# Patient Record
Sex: FEMALE | Race: WHITE | HISPANIC OR LATINO | Employment: UNEMPLOYED | URBAN - METROPOLITAN AREA
[De-identification: names, ages, dates, MRNs, and addresses within clinical notes are randomized per-mention and may not be internally consistent; named-entity substitution may affect disease eponyms.]

---

## 2018-06-11 PROBLEM — E66.812 CLASS 2 OBESITY DUE TO EXCESS CALORIES WITH BODY MASS INDEX (BMI) OF 36.0 TO 36.9 IN ADULT: Status: ACTIVE | Noted: 2018-06-11

## 2018-06-11 PROBLEM — E66.09 CLASS 2 OBESITY DUE TO EXCESS CALORIES WITH BODY MASS INDEX (BMI) OF 36.0 TO 36.9 IN ADULT: Status: ACTIVE | Noted: 2018-06-11

## 2019-06-11 PROBLEM — G51.0 FACIAL PARALYSIS/BELLS PALSY: Status: ACTIVE | Noted: 2019-06-11

## 2020-03-06 PROBLEM — M17.12 PATELLOFEMORAL ARTHRITIS OF LEFT KNEE: Status: ACTIVE | Noted: 2020-03-06

## 2021-09-13 PROBLEM — R07.9 CHEST PAIN: Status: ACTIVE | Noted: 2021-09-13

## 2021-09-14 PROBLEM — E66.812 CLASS 2 OBESITY IN ADULT: Chronic | Status: ACTIVE | Noted: 2021-09-14

## 2022-09-15 PROBLEM — Z74.09 IMPAIRED FUNCTIONAL MOBILITY AND ACTIVITY TOLERANCE: Status: ACTIVE | Noted: 2022-09-15

## 2023-10-23 ENCOUNTER — TELEPHONE (OUTPATIENT)
Dept: FAMILY MEDICINE | Facility: CLINIC | Age: 48
End: 2023-10-23

## 2023-10-23 NOTE — TELEPHONE ENCOUNTER
The following medication needs a prior authorization:     Medication Name: blood-glucose sensor (DEXCOM G7 SENSOR) [place]    Dosage: 1 device     Frequency: every 10 days    Directions for use: 1 Device by Misc.(Non-Drug; Combo Route) route every 10 days    Diagnosis: Type 2 diabetes mellitus without complication, with long-term current use of insulin      Is the request for a reauthorization? No     Is the patient currently stable on therapy? No     Please list all therapeutic alternatives previously used with start/end dates and outcome:    Blood glucose meter kit (Contour next) , lancets, diagnostic strips testing three times daily

## 2023-10-24 ENCOUNTER — TELEPHONE (OUTPATIENT)
Dept: FAMILY MEDICINE | Facility: CLINIC | Age: 48
End: 2023-10-24

## 2024-01-07 NOTE — PROGRESS NOTES
Subjective:       Patient ID: Nicole Coker is a 48 y.o. female.    Chief Complaint: Diabetes, Hyperlipidemia, Manic Behavior, Polypharmacy, Constipation, Social Isolation, and Heavy menstruation and painful menstruation    Chief Complaint   Patient presents with    Diabetes    Hyperlipidemia    Manic Behavior    Polypharmacy    Constipation    Social Isolation    Heavy menstruation and painful menstruation       Patient is a 48-year-old female who comes for follow-up.  She is by herself here today.  Previously she was either accompanied with the  or with her mother.  .    Her chronic medical issues include the following    1.-history of Bell's palsy with significant residual weakness on the right side of the face.  2.-bipolar disorder  3.-type 2 diabetes mellitus  4.-dyslipidemia   5.-chronic constipation on Linzess 72 mcg  6.-polypharmacy and multiple CNS altering medications  7.-chronic disability  8.-social isolation   9.-cognitive impairment    Previous visit we had reviewed about 28 different type of medications, appointments, inhalers, lotions and portions and neither of us was clear as to who is prescribing and for what reasons.  Patient has limited cognition and insight about her medical issues was noted as well as her limited social support structure.  I would advised her to checkup her medication list for every possible medication and as to the reason and the prescriber.  I was also mostly concerned about at least 9 CNS altering medications that she was on which patient was herself unaware about but was listed in the epic system.    She also complains of heavy menstrual periods and painful menstrual periods.  She is on Ortho-Cyclen but does not seem to be helping.  She has not had a gynecological checkup for several years.        Interim updates include the following:-    12/13/2023-saw Ms. Mercy Olmos nurse practitioner-cardiology for intermittent symptoms of shortness on breath  and palpitations.    Her cardiac monitor was reviewed her symptoms did not correlate with any abnormal rhythm on the cardiac monitor.  Maximum heart rate recorded was 143 beats per minute and average heart rate of 92.  Isolated supraventricular ectopics and isolated ventricular ectopics echo results were considered to be normal.  Stress test was negative for reversible ischemia.  Chest pain was considered to be atypical and warning signs and symptoms of MI were discussed.  She also had thoracic back pain and she was recommended stretching exercises.    11/20/2023-saw Ms.Karie Clayton points time, nurse practitioner pulmonary disease     Follow-up for asthma and lot of wheezing noted to be using Brovana and budesonide and DuoNeb 3 times a day.  She was advised to continue the inhalers as before and Singulair 10 mg with a 10 month follow-up.    11/07/2023-saw Dr. Ochoa from [facility].  Symptoms of palpitations, shortness for breath and mixed dyslipidemia were noted and nuclear stress test, cardiac monitor and EKG were ordered.  ////////    On previous visit I would discussed her family members to bring a complete list of medications and semblance of as to what is going on.    Today also I noticed about 28 entries in her prescription lists including inhalers, creams, lotions, male pills, capsules, injections and other devices.  As to who is giving her those medications and for what purpose, patient remains unclear.  Her 9 CNS altering medications include the following:-    1.-trazodone  2.-tizanidine  3.-sertraline  4.-promethazine  5.-primidone  6.-hydroxyzine  7.-gabapentin  8.-Depakote  9.-Abilify    Other potential medication which could affect the CNS include the following:-    1.-Zyrtec with sedation properties  2.-Zofran      Diabetes control:-as to monitoring of blood sugars at home, patient continues to be clueless.  Today she requests a prescription for continues glucose monitoring like Dexcom or Jessica.   She is tired of monitoring her blood sugars with fingerstick and on asking her as to what the numbers are, she is clueless.    Discussed about preventive care issues for the following:-    1.-pneumonia vaccine:-agrees to take pneumonia vaccine today  2.-colorectal screening  3.-Pap smear  4.-ophthalmology examination   5.-flu vaccine  6.-COVID precautions and vaccination.      She states that she has changed her insurance.  She would like to get a neurology consultation again.        Diabetes  She presents for her follow-up diabetic visit. She has type 2 diabetes mellitus. No MedicAlert identification noted. The initial diagnosis of diabetes was made 5 years ago. Hypoglycemia symptoms include dizziness, mood changes, nervousness/anxiousness, sleepiness, sweats and tremors. Pertinent negatives for hypoglycemia include no confusion, headaches, hunger, pallor, seizures or speech difficulty. Associated symptoms include blurred vision, fatigue, foot paresthesias, polydipsia and polyuria. Pertinent negatives for diabetes include no chest pain, no foot ulcerations, no polyphagia, no visual change, no weakness and no weight loss. Pertinent negatives for hypoglycemia complications include no blackouts, no hospitalization, no nocturnal hypoglycemia, no required assistance and no required glucagon injection. Symptoms are stable. Pertinent negatives for diabetic complications include no autonomic neuropathy, CVA, heart disease, impotence, nephropathy, peripheral neuropathy, PVD or retinopathy. There are no known risk factors for coronary artery disease. Current diabetic treatment includes insulin injections and oral agent (monotherapy). She is compliant with treatment all of the time. She is currently taking insulin pre-breakfast and at bedtime. Insulin injections are given by significant other. Rotation sites for injection include the abdominal wall. Her weight is fluctuating dramatically. She is following a diabetic diet.  Meal planning includes carbohydrate counting. She has not had a previous visit with a dietitian. She never participates in exercise. She monitors blood glucose at home 1-2 x per day. She monitors urine at home <1 x per month. Blood glucose monitoring compliance is inadequate. Her home blood glucose trend is fluctuating minimally. She sees a podiatrist.Eye exam is current.       Past Medical History:   Diagnosis Date    Allergy     Anxiety     Asthma     Back pain     Behavioral problem     Bipolar disorder     Depression     Diabetes mellitus type I 2018    Digestive disorder     Facial paralysis/Viola palsy 2019    Headache(784.0)     Hx of psychiatric care     Hypercholesteremia     Left knee pain 2018    pain worse- some swelling- gave out & recently fell- surg scheduled    Migraines     occ    Psychiatric problem     Psychosis     Therapy      Social History     Socioeconomic History    Marital status:    Occupational History    Occupation: Unemployed     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   Tobacco Use    Smoking status: Former     Current packs/day: 0.00     Average packs/day: 0.3 packs/day for 1 year (0.3 ttl pk-yrs)     Types: Cigars, Cigarettes     Start date: 2020     Quit date: 2021     Years since quittin.7    Smokeless tobacco: Never   Substance and Sexual Activity    Alcohol use: Not Currently    Drug use: No    Sexual activity: Yes     Partners: Male     Social Determinants of Health     Financial Resource Strain: Low Risk  (2023)    Overall Financial Resource Strain (CARDIA)     Difficulty of Paying Living Expenses: Not hard at all   Food Insecurity: No Food Insecurity (2023)    Hunger Vital Sign     Worried About Running Out of Food in the Last Year: Never true     Ran Out of Food in the Last Year: Never true   Transportation Needs: No Transportation Needs (2023)    PRAPARE - Transportation     Lack of Transportation  (Medical): No     Lack of Transportation (Non-Medical): No   Physical Activity: Inactive (12/13/2023)    Exercise Vital Sign     Days of Exercise per Week: 4 days     Minutes of Exercise per Session: 0 min   Stress: Stress Concern Present (12/13/2023)    [place] [employer] - Occupational Stress Questionnaire     Feeling of Stress : To some extent   Social Connections: Moderately Isolated (12/13/2023)    Social Connection and Isolation Panel [NHANES]     Frequency of Communication with Friends and Family: Three times a week     Frequency of Social Gatherings with Friends and Family: Never     Attends Hoahaoism Services: Never     Active Member of Clubs or Organizations: No     Attends Club or Organization Meetings: Never     Marital Status:    Housing Stability: High Risk (12/13/2023)    Housing Stability Vital Sign     Unable to Pay for Housing in the Last Year: No     Number of Places Lived in the Last Year: 9     Unstable Housing in the Last Year: No     Past Surgical History:   Procedure Laterality Date    ARTHROSCOPY OF KNEE Left 7/27/2020    Procedure: ARTHROSCOPY, KNEE;  Surgeon: Hardy Delgado MD;  Location: [facility];  Service: Orthopedics;  Laterality: Left;    CARPAL TUNNEL RELEASE Right 1/11/2023    Procedure: RELEASE, CARPAL TUNNEL;  Surgeon: Hardy Delgado MD;  Location: [facility] OR;  Service: Orthopedics;  Laterality: Right;    CHONDROPLASTY OF KNEE  11/13/2019    Procedure: CHONDROPLASTY, KNEE;  Surgeon: Hardy Delgado MD;  Location: [facility] OR;  Service: Orthopedics;;    HAND SURGERY      KNEE ARTHROPLASTY Left 7/27/2020    Procedure: ARTHROPLASTY, KNEE patellofemoral replacement;  Surgeon: Hardy Delgado MD;  Location: [facility];  Service: Orthopedics;  Laterality: Left;  ARTHOSURFACE-NEGRO.  PROCEDURE IS PATELLA FEMORAL REPLACEMENT, NOT TKA    KNEE ARTHROSCOPY W/ MENISCECTOMY Left 11/13/2019    Procedure: ARTHROSCOPY, KNEE, WITH MENISCECTOMY;  Surgeon: Hardy Delgado MD;  Location:  [facility] OR;  Service: Orthopedics;  Laterality: Left;    KNEE ARTHROSCOPY W/ MENISCECTOMY Left 12/1/2021    Procedure: ARTHROSCOPY, KNEE, WITH MENISCECTOMY;  Surgeon: Hardy Delgado MD;  Location: [facility] OR;  Service: Orthopedics;  Laterality: Left;    SHOULDER SURGERY       Family History   Problem Relation Age of Onset    Alcohol abuse Mother     Cirrhosis Mother     Early death Mother     Alcohol abuse Father     Alcohol abuse Sister     Anxiety disorder Sister     Alcohol abuse Brother     Alcohol abuse Maternal Uncle     Drug abuse Maternal Uncle     Suicide Maternal Uncle     Diabetes Maternal Grandmother     Breast cancer Paternal Aunt     Breast cancer Maternal Aunt        Review of Systems   Constitutional:  Positive for fatigue, malaise/fatigue and unexpected weight change (Lost 4 lb of weight). Negative for activity change, appetite change and weight loss.   Eyes:  Positive for blurred vision. Negative for discharge, redness and visual disturbance.        Denies any double vision, diplopia or blurred vision.   Cardiovascular:  Negative for chest pain, palpitations and leg swelling.   Gastrointestinal:  Positive for constipation (Chronic constipation several medications may be causing that.  Currently on Linzess 72 mcg). Negative for abdominal pain, blood in stool, diarrhea, nausea and vomiting.   Endocrine: Positive for polydipsia and polyuria. Negative for polyphagia.        On very high dose of Trulicity.  Uncertain if this is causing her symptoms of nausea vomiting.  Off metformin now..   Genitourinary:  Negative for difficulty urinating, dysuria, hematuria, impotence and menstrual problem.        Menorrhagia and metrorrhagia   Musculoskeletal:  Positive for arthralgias. Negative for joint swelling and neck pain.   Skin:  Negative for pallor, rash and wound.   Neurological:  Positive for dizziness, tremors and syncope. Negative for seizures, speech difficulty, weakness and headaches.        Denies any new  "onset of slurred speech.  Old right-sided Bell's palsy has been noted   Psychiatric/Behavioral:  Positive for behavioral problems and dysphoric mood. Negative for agitation and confusion. The patient is nervous/anxious.         Longstanding mental and behavioral issues on several psychotropic medications including Abilify, Depakote, hydroxyzine         Objective:      Blood pressure 104/80, pulse 93, height 5' 2" (1.575 m), weight 81.6 kg (180 lb), last menstrual period 12/30/2023. Body mass index is 32.92 kg/m².  Physical Exam  Vitals and nursing note reviewed.   Constitutional:       General: She is not in acute distress.     Appearance: She is well-developed. She is ill-appearing. She is not diaphoretic.      Comments: BMI 32.92   HENT:      Head: Normocephalic and atraumatic.      Mouth/Throat:      Mouth: Mucous membranes are moist.   Eyes:      Extraocular Movements:      Right eye: Nystagmus present.      Left eye: Nystagmus present.      Conjunctiva/sclera: Conjunctivae normal.      Pupils:      Right eye: Pupil is round and reactive.      Left eye: Pupil is round and reactive.      Comments: Horizontal nystagmus bilaterally   Neck:      Trachea: Trachea normal.   Cardiovascular:      Rate and Rhythm: Normal rate and regular rhythm.      Pulses:           Dorsalis pedis pulses are 1+ on the right side and 1+ on the left side.      Heart sounds: Normal heart sounds, S1 normal and S2 normal.   Pulmonary:      Effort: Pulmonary effort is normal. No respiratory distress.      Breath sounds: Normal breath sounds.   Abdominal:      Palpations: Abdomen is soft. Abdomen is not rigid.      Tenderness: There is no abdominal tenderness. There is no guarding.   Musculoskeletal:      Cervical back: Neck supple. No muscular tenderness.      Right foot: Normal range of motion.      Left foot: Normal range of motion.   Feet:      Right foot:      Protective Sensation: 5 sites tested.  5 sites sensed.      Skin integrity: No " ulcer or blister.      Toenail Condition: Right toenails are normal.      Left foot:      Protective Sensation: 5 sites tested.  5 sites sensed.      Skin integrity: No ulcer or blister.      Toenail Condition: Left toenails are normal.      Comments: Ant bite marks were noted.  Patient has been advised caution and not to walk bare feet on ground outside.  Lymphadenopathy:      Cervical: No cervical adenopathy.   Skin:     General: Skin is warm and dry.      Coloration: Skin is not jaundiced or pale.      Findings: No lesion or rash.   Neurological:      Mental Status: She is alert. Mental status is at baseline.      Cranial Nerves: Facial asymmetry (chronic) present.      Comments: Left-sided Bell's palsy.  (previously erroneously written as right-sided. ).   Psychiatric:         Mood and Affect: Affect is flat and inappropriate.         Speech: Speech is delayed.         Behavior: Behavior is slowed. Behavior is cooperative.         Cognition and Memory: Cognition is impaired.      Comments: Incongruent and rather flat affect.  Limited insight.           Assessment/Plan:       1. Type 2 diabetes mellitus without complication, with long-term current use of insulin  Comments:  Labs for blood sugar are still pending.  Wanted a CGM device.  Even regular monitoring his difficult and I am not sure if CGM will be really helpful for her.  Orders:  -     blood-glucose sensor (DEXCOM G7 SENSOR) [place]; 1 Device by Misc.(Non-Drug; Combo Route) route every 10 days.  Dispense: 3 each; Refill: 5  -     Hemoglobin A1C; Future; Expected date: 01/12/2024  -     Comprehensive Metabolic Panel; Future; Expected date: 01/12/2024  -     Microalbumin/creatinine urine ratio; Future; Expected date: 01/12/2024  -     Lipid Panel; Future; Expected date: 01/12/2024    2. Dizziness  -     CBC Auto Differential; Future; Expected date: 01/12/2024    3. Other migraine with status migrainosus, not intractable    4. Episodic tension-type headache,  not intractable    5. Chronic idiopathic constipation    6. Hormone replacement therapy    7. At increased risk for social isolation    8. Cognitive deficits    9. Menorrhagia with irregular cycle  -     Ambulatory referral/consult to Obstetrics / Gynecology; Future; Expected date: 01/18/2024  -     CBC Auto Differential; Future; Expected date: 01/12/2024    10. Metrorrhagia  -     Ambulatory referral/consult to Obstetrics / Gynecology; Future; Expected date: 01/18/2024  -     CBC Auto Differential; Future; Expected date: 01/12/2024    11. Vitamin D deficiency  -     Vitamin D; Future; Expected date: 01/12/2024    12. Screen for colon cancer  -     Cologuard Screening (Multitarget Stool DNA); Future; Expected date: 01/11/2024           Management for diabetes continues to be challenging.  Probably devices like CGM and sensors are fancy device with no meaningful benefit on her diabetes.  Labs have not been done at this point.      Hospital Outpatient Visit on 12/11/2023   Component Date Value Ref Range Status    85% Max Predicted HR 12/11/2023 146   Final    Max Predicted HR 12/11/2023 172   Final    [facility] CV CPX PATIENT IS MALE 12/11/2023 0.0   Final    [facility] CV CPX PATIENT IS FEMALE 12/11/2023 1.0   Final    Peak HR 12/11/2023 95.0  bpm Final    % Max HR Achieved 12/11/2023 58   Final    EF + QEF 12/11/2023 53  % Final    Ejection Fraction- High Stress 12/11/2023 65  % Final    End diastolic index (mL/m2) 12/11/2023 93  mL/m2 Final    End diastolic index (mL/m2) 12/11/2023 153  mL/m2 Final    End systolic index (mL/m2) 12/11/2023 31  mL/m2 Final    End systolic index (mL/m2) 12/11/2023 71  mL/m2 Final    Nuc Rest EF 12/11/2023 78   Final    Nuc Rest Diastolic Volume Index 12/11/2023 49   Final    Nuc Rest Systolic Volume Index 12/11/2023 11   Final    Nuc Stress EF 12/11/2023 66  % Final    Nuc Rest Diastolic Volume Index 12/11/2023 56   Final    Nuc Rest Systolic Volume Index 12/11/2023 19   Final    dose 12/11/2023  "0.4  mg Final    HR at rest 12/11/2023 79  bpm Final    Systolic blood pressure 12/11/2023 110  mmHg Final    Diastolic blood pressure 12/11/2023 84  mmHg Final    RPP 12/11/2023 8,690   Final    Peak Systolic BP 12/11/2023 110  mmHg Final    Peak Diatolic BP 12/11/2023 72  mmHg Final    Peak RPP 12/11/2023 10,450   Final    1 Minute Recovery HR 12/11/2023 95  bpm Final   Hospital Outpatient Visit on 11/22/2023   Component Date Value Ref Range Status    BSA 11/22/2023 1.86  m2 Final    LVOT stroke volume 11/22/2023 66.52  cm3 Final    LVIDd 11/22/2023 3.81  3.5 - 6.0 cm Final    LV Systolic Volume 11/22/2023 19.70  mL Final    LV Systolic Volume Index 11/22/2023 10.9  mL/m2 Final    LVIDs 11/22/2023 2.38  2.1 - 4.0 cm Final    LV Diastolic Volume 11/22/2023 62.31  mL Final    LV Diastolic Volume Index 11/22/2023 34.62  mL/m2 Final    IVS 11/22/2023 1.06  0.6 - 1.1 cm Final    LVOT diameter 11/22/2023 2.09  cm Final    LVOT area 11/22/2023 3.4  cm2 Final    FS 11/22/2023 38  28 - 44 % Final    Left Ventricle Relative Wall Thick* 11/22/2023 0.55  cm Final    Posterior Wall 11/22/2023 1.05  0.6 - 1.1 cm Final    LV mass 11/22/2023 127.20  g Final    LV Mass Index 11/22/2023 71  g/m2 Final    MV Peak E Noe 11/22/2023 0.58  m/s Final    TDI LATERAL 11/22/2023 0.11  m/s Final    TDI SEPTAL 11/22/2023 0.06  m/s Final    E/E' ratio 11/22/2023 6.82  m/s Final    MV Peak A Noe 11/22/2023 0.63  m/s Final    TR Max Noe 11/22/2023 1.49  m/s Final    E/A ratio 11/22/2023 0.92   Final    IVRT 11/22/2023 74.22  msec Final    E wave deceleration time 11/22/2023 228.84  msec Final    MV "A" wave duration 11/22/2023 [id number]  msec Final    LV SEPTAL E/E' RATIO 11/22/2023 9.67  m/s Final    LV LATERAL E/E' RATIO 11/22/2023 5.27  m/s Final    LVOT peak noe 11/22/2023 1.08  m/s Final    Left Ventricular Outflow Tract Qian* 11/22/2023 0.74  cm/s Final    Left Ventricular Outflow Tract Qian* 11/22/2023 2.50  mmHg Final    RVDD " 11/22/2023 2.23  cm Final    RV S' 11/22/2023 8.63  cm/s Final    TAPSE 11/22/2023 1.41  cm Final    LA size 11/22/2023 3.08  cm Final    Left Atrium Minor Axis 11/22/2023 4.16  cm Final    Left Atrium Major Axis 11/22/2023 4.34  cm Final    RA Major [place] 11/22/2023 4.13  cm Final    AV mean gradient 11/22/2023 4  mmHg Final    AV peak gradient 11/22/2023 6  mmHg Final    Ao peak paulo 11/22/2023 1.20  m/s Final    Ao VTI 11/22/2023 20.90  cm Final    LVOT peak VTI 11/22/2023 19.40  cm Final    AV valve area 11/22/2023 3.18  cm² Final    AV Velocity Ratio 11/22/2023 0.90   Final    AV index (prosthetic) 11/22/2023 0.93   Final    DORENE by Velocity Ratio 11/22/2023 3.09  cm² Final    MV mean gradient 11/22/2023 1  mmHg Final    MV peak gradient 11/22/2023 3  mmHg Final    MV stenosis pressure 1/2 time 11/22/2023 56.28  ms Final    MV valve area p 1/2 method 11/22/2023 3.91  cm2 Final    MV valve area by continuity eq 11/22/2023 3.39  cm2 Final    MV VTI 11/22/2023 19.6  cm Final    Triscuspid Valve Regurgitation Pea* 11/22/2023 9  mmHg Final    PV PEAK VELOCITY 11/22/2023 1.01  m/s Final    PV peak gradient 11/22/2023 4  mmHg Final    Pulmonary Valve Mean Velocity 11/22/2023 0.74  m/s Final    Ao root annulus 11/22/2023 2.67  cm Final    IVC diameter 11/22/2023 1.30  cm Final    Mean e' 11/22/2023 0.09  m/s Final    ZLVIDS 11/22/2023 -2.03   Final    ZLVIDD 11/22/2023 -2.67   Final    AORTIC VALVE CUSP SEPERATION 11/22/2023 1.77  cm Final    TV resting pulmonary artery pressu* 11/22/2023 12  mmHg Final    RV TB RVSP 11/22/2023 4  mmHg Final    Est. RA pres 11/22/2023 3  mmHg Final   Hospital Outpatient Visit on 11/09/2023   Component Date Value Ref Range Status    Holter Hookup Date 11/09/2023 [id number]   Final    Holter Hookup Time 11/09/2023 [id number]   Final    Holter Study End Date 11/09/2023 [id number]   Final    Holter Study End Time 11/09/2023 [id number]   Final    Holter Scan Date 11/09/2023 [id number]   Final    Sinus min  HR 11/09/2023 69  bpm Final    Sinus max hr 11/09/2023 143  bpm Final    Sinus avg hr 11/09/2023 92  bpm Final    Event Monitor Day 11/09/2023 5   Final    Holter length hours 11/09/2023 0   Final    holter length minutes 11/09/2023 0   Final    holter length dec hours 11/09/2023 120.00   Final     As far as diabetes control is concerned, we are not certain about it.  She was not done her labs which were ordered previously.  Probably limited insight about her medical conditions and rigor of compliance.  Seeks fancy machines and devices but does not work on them to produce optimal results.  (Dexcom).  I have given another prescription for Dexcom.      Labs have been ordered.    Social issues and stewardship of medical care challenges still continue.    Her chest pain was determined to be probably noncardiac after normal stress test and cardiac monitor.        Following preventive care measures have been discussed:-  1.-influenza vaccine-completed on 10/01/2023  2.-pneumonia vaccine-completed on 10/17/2023  3.-COVID precautions and vaccination-had 3 vaccinations thus far.  4.-RSV vaccine which is available at this point-consider getting the vaccine from pharmacy  5..-colonoscopy or colorectal had a brief discussion about.  Agrees for Cologuard testing.  Hopefully her mother helps her or family members help her accomplish this test.    She does have some menorrhagia and menometrorrhagia.  Her last gynecological checkup was perhaps several years back.  She does not remember or know our previous gynecologist was.  She may not have enough knowledge/motivation or hustle to accomplish a gynecological follow-up.  Referral has been given.  Advised family members to help her.    Labs have been read written and ordered again including vitamin-D level.    Follow-up to be arranged in about 3-4 months time.      Multiple psychotropic medications have been noted and I still do not have a clue who her psychiatrist is.  Patient  "herself does not remember.  (outside epic system).        Spent andrae 30 minutes with patient which involved review of pts medical conditions, labs, medications and with 50% of time face-to-face discussion about medical problems, management and any applicable changes.  Follow up in about 4 months (around 5/11/2024), or if symptoms worsen or fail to improve, for Diabetes/HTN/Lipids.      Current Outpatient Medications:     albuterol-ipratropium (DUO-NEB) 2.5 mg-0.5 mg/3 mL nebulizer solution, Take 3 mLs by nebulization every 6 (six) hours as needed for Wheezing. Rescue, Disp: 120 each, Rfl: 4    arformoteroL (BROVANA) 15 mcg/2 mL Nebu, Take 2 mLs (15 mcg total) by nebulization every 12 (twelve) hours. Controller, Disp: 120 mL, Rfl: 6    ARIPiprazole (ABILIFY) 5 MG Tab, Take 5 mg by mouth once daily., Disp: , Rfl:     atorvastatin (LIPITOR) 40 MG tablet, TAKE 1 TABLET BY MOUTH ONCE DAILY IN THE EVENING, Disp: 90 tablet, Rfl: 1    azelastine (ASTELIN) 137 mcg (0.1 %) nasal spray, 1 spray (137 mcg total) by Nasal route 2 (two) times daily., Disp: 30 mL, Rfl: 5    BD ULTRA-FINE SHORT PEN NEEDLE 31 gauge x 5/16" Ndle, USE AS DIRECTED ONCE IN THE EVENING, Disp: 100 each, Rfl: 5    blood sugar diagnostic Strp, 1 strip by Misc.(Non-Drug; Combo Route) route 3 (three) times daily. Contour next EZ test strips, Disp: 50 strip, Rfl: 6    cetirizine (ZYRTEC) 10 MG tablet, Take 1 tablet by mouth once daily, Disp: 30 tablet, Rfl: 2    CHILDREN'S ASPIRIN 81 mg Chew, Take 81 mg by mouth., Disp: , Rfl:     diclofenac (VOLTAREN) 75 MG EC tablet, Take 1 tablet (75 mg total) by mouth 2 (two) times daily as needed (back pain)., Disp: 30 tablet, Rfl: 2    divalproex ER (DEPAKOTE) 500 MG Tb24, Take 1,000 mg by mouth once daily. , Disp: , Rfl: 2    dulaglutide (TRULICITY) 4.5 mg/0.5 mL pen injector, Inject 4.5 mg into the skin every 7 days., Disp: 4 pen, Rfl: 11    ergocalciferol (ERGOCALCIFEROL) 50,000 unit Cap, Take 1 capsule (50,000 Units " total) by mouth every 7 days., Disp: 4 capsule, Rfl: 5    fluticasone propionate (FLONASE) 50 mcg/actuation nasal spray, Use 1 spray(s) in each nostril twice daily, Disp: 16 g, Rfl: 5    gabapentin (NEURONTIN) 300 MG capsule, Take 1 capsule (300 mg total) by mouth every evening., Disp: 30 capsule, Rfl: 2    hydrOXYzine pamoate (VISTARIL) 25 MG Cap, Take 1 capsule by mouth nightly as needed., Disp: , Rfl: 1    insulin (LANTUS SOLOSTAR U-100 INSULIN) glargine 100 units/mL SubQ pen, INJECT 65 UNITS SUBCUTANEOUSLY IN THE MORNING  AND 65 UNITS SUBCUTANEOUSLY IN THE EVENING ,MAY  TITRATE  UP  2  UNITS  PER  WEEK  TO  REACH  FASTING  GLUCOSE  OF  110. Max dose 85 units daily, Disp: 30 mL, Rfl: 2    lancets 32 gauge Misc, 1 lancet by Misc.(Non-Drug; Combo Route) route 3 (three) times daily as needed., Disp: 100 each, Rfl: 5    linaCLOtide (LINZESS) 72 mcg Cap capsule, Take 1 capsule (72 mcg total) by mouth before breakfast., Disp: 30 capsule, Rfl: 5    montelukast (SINGULAIR) 10 mg tablet, Take 1 tablet (10 mg total) by mouth every evening., Disp: 30 tablet, Rfl: 6    nebulizer and compressor (COMP-AIR NEBULIZER COMPRESSOR) Tanvi, To be used to nebulize medication, Disp: 1 each, Rfl: 0    norgestimate-ethinyl estradioL (ORTHO-CYCLEN) 0.25-35 mg-mcg per tablet, Take 1 tablet by mouth once daily., Disp: 28 tablet, Rfl: 11    ondansetron (ZOFRAN-ODT) 4 MG TbDL, Take 1 tablet (4 mg total) by mouth every 6 (six) hours as needed (nausea)., Disp: 30 tablet, Rfl: 1    primidone (MYSOLINE) 50 MG Tab, Take 100 mg by mouth 3 (three) times daily., Disp: , Rfl:     sertraline (ZOLOFT) 50 MG tablet, Take 50 mg by mouth., Disp: , Rfl:     sumatriptan (IMITREX) 100 MG tablet, Take 1 tablet (100 mg total) by mouth as needed (Take at onset of the headache and repeat 2 hours later if needed.  Max 2 tabs in 24 hours.)., Disp: 15 tablet, Rfl: 5    TENS unit and electrodes Cmpk, 1 each by Misc.(Non-Drug; Combo Route) route once daily., Disp: 1  each, Rfl: 0    tiZANidine (ZANAFLEX) 4 MG tablet, Take 1 tablet (4 mg total) by mouth every 8 (eight) hours., Disp: 90 tablet, Rfl: 5    traZODone (DESYREL) 100 MG tablet, Take 150 mg by mouth nightly as needed., Disp: , Rfl:     blood-glucose meter kit, Use as instructed, Disp: 1 each, Rfl: 0    blood-glucose sensor (DEXCOM G7 SENSOR) Tanvi, 1 Device by Misc.(Non-Drug; Combo Route) route every 10 days., Disp: 3 each, Rfl: 5

## 2024-01-11 ENCOUNTER — OFFICE VISIT (OUTPATIENT)
Dept: FAMILY MEDICINE | Facility: CLINIC | Age: 49
End: 2024-01-11
Payer: MEDICAID

## 2024-01-11 VITALS
BODY MASS INDEX: 33.13 KG/M2 | SYSTOLIC BLOOD PRESSURE: 104 MMHG | HEART RATE: 93 BPM | DIASTOLIC BLOOD PRESSURE: 80 MMHG | HEIGHT: 62 IN | WEIGHT: 180 LBS

## 2024-01-11 DIAGNOSIS — G43.801 OTHER MIGRAINE WITH STATUS MIGRAINOSUS, NOT INTRACTABLE: ICD-10-CM

## 2024-01-11 DIAGNOSIS — N92.1 MENORRHAGIA WITH IRREGULAR CYCLE: ICD-10-CM

## 2024-01-11 DIAGNOSIS — Z79.890 HORMONE REPLACEMENT THERAPY: ICD-10-CM

## 2024-01-11 DIAGNOSIS — Z91.89 AT INCREASED RISK FOR SOCIAL ISOLATION: ICD-10-CM

## 2024-01-11 DIAGNOSIS — Z12.11 SCREEN FOR COLON CANCER: ICD-10-CM

## 2024-01-11 DIAGNOSIS — G44.219 EPISODIC TENSION-TYPE HEADACHE, NOT INTRACTABLE: ICD-10-CM

## 2024-01-11 DIAGNOSIS — N92.1 METRORRHAGIA: ICD-10-CM

## 2024-01-11 DIAGNOSIS — K59.04 CHRONIC IDIOPATHIC CONSTIPATION: ICD-10-CM

## 2024-01-11 DIAGNOSIS — E55.9 VITAMIN D DEFICIENCY: ICD-10-CM

## 2024-01-11 DIAGNOSIS — E11.9 TYPE 2 DIABETES MELLITUS WITHOUT COMPLICATION, WITH LONG-TERM CURRENT USE OF INSULIN: Primary | Chronic | ICD-10-CM

## 2024-01-11 DIAGNOSIS — Z79.4 TYPE 2 DIABETES MELLITUS WITHOUT COMPLICATION, WITH LONG-TERM CURRENT USE OF INSULIN: Primary | Chronic | ICD-10-CM

## 2024-01-11 DIAGNOSIS — R41.89 COGNITIVE DEFICITS: ICD-10-CM

## 2024-01-11 DIAGNOSIS — R42 DIZZINESS: ICD-10-CM

## 2024-01-11 PROCEDURE — 1159F MED LIST DOCD IN RCRD: CPT | Mod: CPTII,,, | Performed by: INTERNAL MEDICINE

## 2024-01-11 PROCEDURE — 3079F DIAST BP 80-89 MM HG: CPT | Mod: CPTII,,, | Performed by: INTERNAL MEDICINE

## 2024-01-11 PROCEDURE — 99214 OFFICE O/P EST MOD 30 MIN: CPT | Mod: PBBFAC,PN | Performed by: INTERNAL MEDICINE

## 2024-01-11 PROCEDURE — 1160F RVW MEDS BY RX/DR IN RCRD: CPT | Mod: CPTII,,, | Performed by: INTERNAL MEDICINE

## 2024-01-11 PROCEDURE — 3074F SYST BP LT 130 MM HG: CPT | Mod: CPTII,,, | Performed by: INTERNAL MEDICINE

## 2024-01-11 PROCEDURE — 99999 PR PBB SHADOW E&M-EST. PATIENT-LVL IV: CPT | Mod: PBBFAC,,, | Performed by: INTERNAL MEDICINE

## 2024-01-11 PROCEDURE — 3008F BODY MASS INDEX DOCD: CPT | Mod: CPTII,,, | Performed by: INTERNAL MEDICINE

## 2024-01-11 PROCEDURE — 99214 OFFICE O/P EST MOD 30 MIN: CPT | Mod: S$PBB,,, | Performed by: INTERNAL MEDICINE

## 2024-01-11 RX ORDER — BLOOD-GLUCOSE SENSOR
1 EACH MISCELLANEOUS
Qty: 3 EACH | Refills: 5 | Status: SHIPPED | OUTPATIENT
Start: 2024-01-11 | End: 2025-01-10

## 2024-01-12 ENCOUNTER — PATIENT OUTREACH (OUTPATIENT)
Dept: ADMINISTRATIVE | Facility: HOSPITAL | Age: 49
End: 2024-01-12
Payer: MEDICAID

## 2024-01-12 NOTE — PROGRESS NOTES
Population Health Chart Review & Patient Outreach Details    Outreach Performed: YES Portal    Additional [facility] Health Notes:           Updates Requested / Reviewed:      Updated Care Coordination Note, Care Everywhere, , Care Team Updated, and Immunizations Reconciliation Completed or Queried: [place]         Health Maintenance Topics Overdue:    Health Maintenance Due   Topic Date Due    Cervical Cancer Screening  Never done    Colorectal Cancer Screening  Never done    Eye Exam  07/23/2021    COVID-19 Vaccine (4 - 2023-24 season) 09/01/2023    Hemoglobin A1c  11/09/2023         Health Maintenance Topic(s) Outreach Outcomes & Actions Taken:    Cervical Cancer Screening - Outreach Outcomes & Actions Taken  : Pt Will Schedule with External Provider / Order Routed & Care Team Updated if Applicable    Colorectal Cancer Screening - Outreach Outcomes & Actions Taken  : Reminded Patient to Complete Already Received Home Test    Eye Exam - Outreach Outcomes & Actions Taken  : External Records Requested & Care Team Updated if Applicable

## 2024-01-12 NOTE — LETTER
AUTHORIZATION FOR RELEASE OF   CONFIDENTIAL INFORMATION    Dear Faith Franco,    We are seeing Nicole Coker, date of birth 1975, in the clinic at [facility]. Jaydon Gee MD is the patient's PCP. Nicole Coker has an outstanding lab/procedure at the time we reviewed her chart. In order to help keep her health information updated, she has authorized us to request the following medical record(s):     (  x)  EYE EXAM            Please fax records to [facility], Raina, Sanjay, MD, [phone]   If you have any questions, please contact       Romi Barajas  Nurse Clinical Care Coordinator  [facility]  Phone: [phone]  Fax: [phone]    Patient Name: Nicole Coker  : 1975  Patient Phone #: [phone]         CONSENT AND ACKNOWLEDGEMENT         FORM       Nicole Coker  MRN: [id number]  : 1975  Age: 48 y.o.  Sex: female       MEDICARE-PATIENTS CERTIFICATION, AUTHORIZATION TO RELEASE INFORMATION AND PAYMENT REQUEST:  I certify that the information given by me in applying under the Title XVII of Social Security Act is correct.  I authorize any vasquez of medical or other information about me to release to the Social Security Administration or its intermediaries or carriers any information needed for this or a related Medicare claim.  I request that payment of authorized benefits be made on my behalf to [facility] access hospital and [facility] ([facility]).  I also acknowledge upon admission, that I received the Important Message from Medicare.     AUTHORIZATION TO PAY INSURANCE BENEFITS:  For and in consideration of medical services rendered to the patient named herein, I hereby assign and transfer to [facility], including but not limited to hospital based physicians, attending physicians, consulting physicians, nurse  practitioners and physicians assistants the rights for the payment of medical benefits which I may have under the policy/policies identified by me during registration or any policy which may be determined hereafter to pay benefits otherwise payable to me or to a beneficiary designated in the policy.  By this assignment, I authorize payment directly to [facility], hospital based physicians, attending physicians and consulting physicians of all medical benefits payable under the aforesaid policy/policies, but not to exceed the hospitals and/or clinic regular charges.     GUARANTEE OF ACCOUNT:  I/We certify that the information given is true and correct to the best of my/our knowledge.  I/We understand that bills are payable within thirty (30) days of the date of service.  If it becomes necessary for the account to be referred to an  or collection agency, the undersigned agrees to pay the reasonable s fees or collection expenses. I/We safia permission and consent to [facility], our assignees, and third party collection agents to contact myself/us by any telephone number associated with myself/us, including wireless numbers and to leave answering machine and voicemail messages and include in any such messages, information required by law (including debt collection laws) and/or messages regarding amounts owed; to send text messages or emails using any email addresses I/we provided; to use pre-recorded/artificial voice messages  and/or an automatic dialing device in connection with any communications.   I/We agree to be responsible for the payment of all charges of this medical service and hospital based physicians, attending physicians and consulting physicians services rendered to the above named patient     COMMUNICATION AUTHORIZATION:   I hereby authorize Deneen Stephenson, to contact me on my cell phone and/or home phone using prerecorded messages, artificial voice messages, automatic  telephone dialing devices or other computer assisted technology, or by electronic mail, text messaging, or by any other form of electronic communication. This includes, but is not limited to, appointment reminders, yearly physical exam reminders, preventive care reminders, patient campaigns and welcome calls. I understand I have the right to opt out of these communications at any time.        Page 1 of 3                 CONSENT AND ACKNOWLEDGEMENT FORM CONTINUED     AUTHORIZATION TO RELEASE INFORMATION:  I hereby authorize [facility] and hospital based physicians to release the information for this occasion of service requested by my insurance company or third party payor for the purpose of obtaining payment for services rendered during this admission and/or to other healthcare providers for the purpose of follow-up care or evaluation of care.  This information may or may not include mental health and/or substance abuse information.     AUTHORIZATION FOR MEDICAL AND/OR SURGICAL TREATMENT:  I hereby authorize [facility] and its employees or agents to provide hospital care incident to this admission, including without limitations, consent to routine diagnostic procedures and medical treatment, which is to include whatever procedures that are deemed necessary by the admitting doctor and such other physicians or assistants as he may designate.     PERSONAL VALUABLES:      It is understood and agreed that the hospital maintains a safe for the safekeeping of money and valuables and the hospital shall not be liable for the loss of damage to any money, jewelry, glasses, documents, dentures, hearing aids or other articles of unusual value, unless placed therein, and shall not be liable for loss or damage to any other personal property, unless deposited with the hospital for safekeeping.  VALUABLES ARE NOT TO BE LEFT IN THE PATIENTS ROOM.     ADVANCE DIRECTIVES:  I understand that I am not required to have  Advance Directives in order to be treated.  I have received written information about my rights to formulate Advance Directives.     NOTICE OF PRIVACY PRACTICES/PATIENT RIGHTS/ADMISSION PACKET:  I acknowledge that I have received copies of the [facility] Notice of Privacy Practices, Patient Rights, and the Admission packet, which contains Smoking Cessation information. I understand that weapons, illegal drugs, or any other items considered  contraband, are not allowed on the [facility] campus, and that I do not have such items in my possession.        CONSENT TO PHOTOGRAPH AND/OR VIDEO TAPE DOCUMENTATION OF CARE:  I understand that photographs, videotapes, digital, or other images may be recorded to document my care.  I acknowledge that [facility] will retain the ownership rights to these photographs, videotapes, digital, or other images, and that I will be allowed access to view or obtain copies of any photographs, videotapes, digital, or other images created as part of the documentation of my care.  I understand that these images will be stored in a secure manner that will protect my privacy and that they will be kept for the time period required by law or by policy at [facility]. Images that identify me will be released and/or used outside the institution only upon written authorization from me or my legal representative ([place], 2001).                                                                                                                                Page 2 of 3                    CONSENT AND ACKNOWLEDGEMENT FORM CONTINUED     [place] IMMUNIZATION NETWORK (LINKS) PARTICIPATION:  I acknowledge that I have been informed about [place] Immunization Network, or LINKS.  I understand that it is a means to keep track of my immunization records for myself, doctors offices, hospitals and other health care providers through secure, electronic means.     INSURANCE NETWORK ACKNOWLEDGEMENT:                                                                             I acknowledge that I have received notice, based on the information available at this time, regarding the status of my insurance plan as in or out of network at [facility]. I understand that a full listing of accepted insurance plans can be found at the [facility] website.     NOTICE     HEALTH CARE SERVICES MAY BE PROVIDED TO YOU AT A NETWORK HEALTH CARE FACILITY BY FACILITY-BASED PHYSICIANS WHO ARE NOT IN YOUR HEALTH PLAN.  YOU MAY BE RESPONSIBLE FOR PAYMENT OF ALL OR PART OF THE FEES FOR THOSE OUT-OF-NETWORK SERVICES, IN ADDITION TO APPLICABLE AMOUNTS DUE FOR CO-PAYMENTS, COINSURANCE, DEDUCTIBLES, AND NON-COVERED SERVICES.  SPECIFIC INFORMATION ABOUT IN-NETWORK AND OUT-OF NETWORK FACILITY-BASED PHYSICIANS CAN BE FOUND AT THE WEBSITE ADDRESS OF YOUR HEALTH PLAN OR BY CALLING THE [email or web address] TELEPHONE NUMBER OF YOUR HEALTH PLAN.        I/WE HAVE READ, UNDERSTAND AND AGREE TO THE ABOVE.        _______________________________   Patient/Legal Guardian Signature     This signature was collected at 07/07/2023     Time (if no electronic signature):____________            _______________________________   Printed Name/Relationship to Patient       Witness  _______________________________   Witness Signature     This signature was collected at 07/07/2023        _______________________________   Printed Name         Page 3 of 3

## 2024-01-16 ENCOUNTER — TELEPHONE (OUTPATIENT)
Dept: OBSTETRICS AND GYNECOLOGY | Facility: CLINIC | Age: 49
End: 2024-01-16
Payer: MEDICAID

## 2024-01-16 NOTE — TELEPHONE ENCOUNTER
----- Message from Anne Marie Rodriguez sent at 1/16/2024 10:26 AM CST -----  Regarding: Call back  Type:  Sooner Apoointment Request    Caller is requesting a sooner appointment.  Caller declined first available appointment listed below.  Caller will not accept being placed on the waitlist and is requesting a message be sent to doctor.  Name of Caller:Pt    When is the first available appointment?n/a    Symptoms:Menorrhagia with irregular cycle    Would the patient rather a call back or a response via [facility]? Call back    Best Call Back Number:[phone]    Additional Information: Pt would like a sooner appt she cancelled appt for 1/17 due to bad weather. Thank you.

## 2024-01-29 ENCOUNTER — PATIENT MESSAGE (OUTPATIENT)
Dept: ADMINISTRATIVE | Facility: OTHER | Age: 49
End: 2024-01-29
Payer: MEDICAID

## 2024-01-30 LAB — NONINV COLON CA DNA+OCC BLD SCRN STL QL: NEGATIVE

## 2024-03-28 ENCOUNTER — PATIENT MESSAGE (OUTPATIENT)
Dept: ADMINISTRATIVE | Facility: HOSPITAL | Age: 49
End: 2024-03-28
Payer: MEDICAID

## 2024-04-15 ENCOUNTER — LAB VISIT (OUTPATIENT)
Dept: LAB | Facility: HOSPITAL | Age: 49
End: 2024-04-15
Attending: INTERNAL MEDICINE
Payer: COMMERCIAL

## 2024-04-15 ENCOUNTER — OFFICE VISIT (OUTPATIENT)
Dept: FAMILY MEDICINE | Facility: CLINIC | Age: 49
End: 2024-04-15
Payer: MEDICAID

## 2024-04-15 VITALS
SYSTOLIC BLOOD PRESSURE: 108 MMHG | BODY MASS INDEX: 33.13 KG/M2 | DIASTOLIC BLOOD PRESSURE: 51 MMHG | HEIGHT: 62 IN | WEIGHT: 180 LBS

## 2024-04-15 DIAGNOSIS — E11.65 TYPE 2 DIABETES MELLITUS WITH HYPERGLYCEMIA, WITH LONG-TERM CURRENT USE OF INSULIN: Primary | ICD-10-CM

## 2024-04-15 DIAGNOSIS — R42 DIZZINESS AND GIDDINESS: Primary | ICD-10-CM

## 2024-04-15 DIAGNOSIS — E55.9 AVITAMINOSIS D: ICD-10-CM

## 2024-04-15 DIAGNOSIS — Z79.4 ENCOUNTER FOR LONG-TERM (CURRENT) USE OF INSULIN: ICD-10-CM

## 2024-04-15 DIAGNOSIS — E11.9 TYPE 2 DIABETES MELLITUS WITHOUT COMPLICATION, WITH LONG-TERM CURRENT USE OF INSULIN: Chronic | ICD-10-CM

## 2024-04-15 DIAGNOSIS — Z79.4 TYPE 2 DIABETES MELLITUS WITH HYPERGLYCEMIA, WITH LONG-TERM CURRENT USE OF INSULIN: Primary | ICD-10-CM

## 2024-04-15 DIAGNOSIS — M79.641 RIGHT HAND PAIN: ICD-10-CM

## 2024-04-15 DIAGNOSIS — Z79.4 TYPE 2 DIABETES MELLITUS WITHOUT COMPLICATION, WITH LONG-TERM CURRENT USE OF INSULIN: Chronic | ICD-10-CM

## 2024-04-15 DIAGNOSIS — E11.9 DIABETES MELLITUS WITHOUT COMPLICATION: ICD-10-CM

## 2024-04-15 DIAGNOSIS — N92.1 METRORRHAGIA: ICD-10-CM

## 2024-04-15 LAB
25(OH)D3+25(OH)D2 SERPL-MCNC: 37 NG/ML (ref 30–96)
ALBUMIN SERPL BCP-MCNC: 3.9 G/DL (ref 3.5–5.2)
ALBUMIN/CREAT UR: 39.1 UG/MG (ref 0–30)
ALP SERPL-CCNC: 101 U/L (ref 55–135)
ALT SERPL W/O P-5'-P-CCNC: 59 U/L (ref 10–44)
ANION GAP SERPL CALC-SCNC: 9 MMOL/L (ref 8–16)
AST SERPL-CCNC: 66 U/L (ref 10–40)
BASOPHILS # BLD AUTO: 0.04 K/UL (ref 0–0.2)
BASOPHILS NFR BLD: 0.4 % (ref 0–1.9)
BILIRUB SERPL-MCNC: 0.5 MG/DL (ref 0.1–1)
BUN SERPL-MCNC: 10 MG/DL (ref 6–20)
CALCIUM SERPL-MCNC: 9.2 MG/DL (ref 8.7–10.5)
CHLORIDE SERPL-SCNC: 98 MMOL/L (ref 95–110)
CHOLEST SERPL-MCNC: 167 MG/DL (ref 120–199)
CHOLEST/HDLC SERPL: 4.3 {RATIO} (ref 2–5)
CO2 SERPL-SCNC: 25 MMOL/L (ref 23–29)
CREAT SERPL-MCNC: 0.6 MG/DL (ref 0.5–1.4)
CREAT UR-MCNC: 51.6 MG/DL (ref 15–325)
DIFFERENTIAL METHOD BLD: NORMAL
EOSINOPHIL # BLD AUTO: 0.1 K/UL (ref 0–0.5)
EOSINOPHIL NFR BLD: 1 % (ref 0–8)
ERYTHROCYTE [DISTWIDTH] IN BLOOD BY AUTOMATED COUNT: 13.5 % (ref 11.5–14.5)
EST. GFR  (NO RACE VARIABLE): >60 ML/MIN/1.73 M^2
ESTIMATED AVG GLUCOSE: 266 MG/DL (ref 68–131)
GLUCOSE SERPL-MCNC: 395 MG/DL (ref 70–110)
HBA1C MFR BLD: 10.9 % (ref 4.5–6.2)
HCT VFR BLD AUTO: 44.9 % (ref 37–48.5)
HDLC SERPL-MCNC: 39 MG/DL (ref 40–75)
HDLC SERPL: 23.4 % (ref 20–50)
HGB BLD-MCNC: 14.8 G/DL (ref 12–16)
IMM GRANULOCYTES # BLD AUTO: 0.03 K/UL (ref 0–0.04)
IMM GRANULOCYTES NFR BLD AUTO: 0.3 % (ref 0–0.5)
LDLC SERPL CALC-MCNC: 101.4 MG/DL (ref 63–159)
LYMPHOCYTES # BLD AUTO: 3.5 K/UL (ref 1–4.8)
LYMPHOCYTES NFR BLD: 37.4 % (ref 18–48)
MCH RBC QN AUTO: 28.5 PG (ref 27–31)
MCHC RBC AUTO-ENTMCNC: 33 G/DL (ref 32–36)
MCV RBC AUTO: 86 FL (ref 82–98)
MICROALBUMIN UR DL<=1MG/L-MCNC: 20.2 UG/ML
MONOCYTES # BLD AUTO: 0.6 K/UL (ref 0.3–1)
MONOCYTES NFR BLD: 5.8 % (ref 4–15)
NEUTROPHILS # BLD AUTO: 5.2 K/UL (ref 1.8–7.7)
NEUTROPHILS NFR BLD: 55.1 % (ref 38–73)
NONHDLC SERPL-MCNC: 128 MG/DL
NRBC BLD-RTO: 0 /100 WBC
PLATELET # BLD AUTO: 279 K/UL (ref 150–450)
PMV BLD AUTO: 9.8 FL (ref 9.2–12.9)
POTASSIUM SERPL-SCNC: 4.2 MMOL/L (ref 3.5–5.1)
PROT SERPL-MCNC: 6.9 G/DL (ref 6–8.4)
RBC # BLD AUTO: 5.2 M/UL (ref 4–5.4)
SODIUM SERPL-SCNC: 132 MMOL/L (ref 136–145)
TRIGL SERPL-MCNC: 133 MG/DL (ref 30–150)
WBC # BLD AUTO: 9.41 K/UL (ref 3.9–12.7)

## 2024-04-15 PROCEDURE — 1159F MED LIST DOCD IN RCRD: CPT | Mod: CPTII,,, | Performed by: INTERNAL MEDICINE

## 2024-04-15 PROCEDURE — 3074F SYST BP LT 130 MM HG: CPT | Mod: CPTII,,, | Performed by: INTERNAL MEDICINE

## 2024-04-15 PROCEDURE — 36415 COLL VENOUS BLD VENIPUNCTURE: CPT | Performed by: INTERNAL MEDICINE

## 2024-04-15 PROCEDURE — 3066F NEPHROPATHY DOC TX: CPT | Mod: CPTII,,, | Performed by: INTERNAL MEDICINE

## 2024-04-15 PROCEDURE — 85025 COMPLETE CBC W/AUTO DIFF WBC: CPT | Performed by: INTERNAL MEDICINE

## 2024-04-15 PROCEDURE — 1160F RVW MEDS BY RX/DR IN RCRD: CPT | Mod: CPTII,,, | Performed by: INTERNAL MEDICINE

## 2024-04-15 PROCEDURE — 3078F DIAST BP <80 MM HG: CPT | Mod: CPTII,,, | Performed by: INTERNAL MEDICINE

## 2024-04-15 PROCEDURE — 82306 VITAMIN D 25 HYDROXY: CPT | Performed by: INTERNAL MEDICINE

## 2024-04-15 PROCEDURE — 83036 HEMOGLOBIN GLYCOSYLATED A1C: CPT | Performed by: INTERNAL MEDICINE

## 2024-04-15 PROCEDURE — 80053 COMPREHEN METABOLIC PANEL: CPT | Performed by: INTERNAL MEDICINE

## 2024-04-15 PROCEDURE — 3046F HEMOGLOBIN A1C LEVEL >9.0%: CPT | Mod: CPTII,,, | Performed by: INTERNAL MEDICINE

## 2024-04-15 PROCEDURE — 82043 UR ALBUMIN QUANTITATIVE: CPT | Performed by: INTERNAL MEDICINE

## 2024-04-15 PROCEDURE — 3008F BODY MASS INDEX DOCD: CPT | Mod: CPTII,,, | Performed by: INTERNAL MEDICINE

## 2024-04-15 PROCEDURE — 99213 OFFICE O/P EST LOW 20 MIN: CPT | Mod: S$PBB,,, | Performed by: INTERNAL MEDICINE

## 2024-04-15 PROCEDURE — 99999 PR PBB SHADOW E&M-EST. PATIENT-LVL IV: CPT | Mod: PBBFAC,,, | Performed by: INTERNAL MEDICINE

## 2024-04-15 PROCEDURE — 80061 LIPID PANEL: CPT | Performed by: INTERNAL MEDICINE

## 2024-04-15 PROCEDURE — 3060F POS MICROALBUMINURIA REV: CPT | Mod: CPTII,,, | Performed by: INTERNAL MEDICINE

## 2024-04-15 PROCEDURE — 99214 OFFICE O/P EST MOD 30 MIN: CPT | Mod: PBBFAC,PN | Performed by: INTERNAL MEDICINE

## 2024-04-15 RX ORDER — PEN NEEDLE, DIABETIC 30 GX3/16"
1 NEEDLE, DISPOSABLE MISCELLANEOUS 2 TIMES DAILY
Qty: 100 EACH | Refills: 5 | Status: SHIPPED | OUTPATIENT
Start: 2024-04-15

## 2024-04-15 NOTE — PROGRESS NOTES
Subjective:       Patient ID: Nicole Coker is a 49 y.o. female.    Chief Complaint: Hand Pain and Diabetes    Patient is a 49-year-old female comes for follow-up.  Today's reason for follow-up is as below:-    1.-she would like to get a referral to Dr. Hardy Delgado for her pain in the right hand which involves the thumb, index finger and the middle finger.  In past she had a carpal tunnel release surgery done by Dr. Hardy Delgado and was doing well in between.      2.The other issue was for which she did not come is that I would reviewed her lab and her blood sugars are extremely elevated with a hemoglobin A1c of greater than 10.  She confesses that she has stopped taking her insulin.  As to why she has stopped taking her insulin, she states that she just slacked off and felt like giving up.  She does not plan to harm herself and she has not suicidal.    Hand Pain   The incident occurred more than 1 week ago. There was no injury mechanism. The quality of the pain is described as aching and cramping. Pertinent negatives include no chest pain.       Past Medical History:   Diagnosis Date    Allergy     Anxiety     Asthma     Back pain     Behavioral problem     Bipolar disorder     Depression     Diabetes mellitus type I 2018    Digestive disorder     Facial paralysis/Haugen palsy 06/11/2019    Headache(784.0)     Hx of psychiatric care     Hypercholesteremia     Left knee pain 2018    pain worse- some swelling- gave out & recently fell- surg scheduled    Migraines     occ    Psychiatric problem     Psychosis     Therapy      Social History     Socioeconomic History    Marital status:    Occupational History    Occupation: Unemployed     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   Tobacco Use    Smoking status: Former     Current packs/day: 0.00     Average packs/day: 0.3 packs/day for 1 year (0.3 ttl pk-yrs)     Types: Cigars, Cigarettes     Start date: 4/6/2020     Quit date:  4/6/2021     Years since quitting: 3.0    Smokeless tobacco: Never   Substance and Sexual Activity    Alcohol use: Not Currently    Drug use: No    Sexual activity: Yes     Partners: Male     Social Determinants of Health     Financial Resource Strain: Low Risk  (12/13/2023)    Overall Financial Resource Strain (CARDIA)     Difficulty of Paying Living Expenses: Not hard at all   Food Insecurity: No Food Insecurity (12/13/2023)    Hunger Vital Sign     Worried About Running Out of Food in the Last Year: Never true     Ran Out of Food in the Last Year: Never true   Transportation Needs: No Transportation Needs (12/13/2023)    PRAPARE - Transportation     Lack of Transportation (Medical): No     Lack of Transportation (Non-Medical): No   Physical Activity: Inactive (12/13/2023)    Exercise Vital Sign     Days of Exercise per Week: 4 days     Minutes of Exercise per Session: 0 min   Stress: Stress Concern Present (12/13/2023)    [place] [employer] [employer] - Occupational Stress Questionnaire     Feeling of Stress : To some extent   Social Connections: Moderately Isolated (12/13/2023)    Social Connection and Isolation Panel [NHANES]     Frequency of Communication with Friends and Family: Three times a week     Frequency of Social Gatherings with Friends and Family: Never     Attends Church Services: Never     Active Member of Clubs or Organizations: No     Attends Club or Organization Meetings: Never     Marital Status:    Housing Stability: High Risk (12/13/2023)    Housing Stability Vital Sign     Unable to Pay for Housing in the Last Year: No     Number of Places Lived in the Last Year: 9     Unstable Housing in the Last Year: No     Past Surgical History:   Procedure Laterality Date    ARTHROSCOPY OF KNEE Left 7/27/2020    Procedure: ARTHROSCOPY, KNEE;  Surgeon: Hardy Delgado MD;  Location: [facility];  Service: Orthopedics;  Laterality: Left;    CARPAL TUNNEL RELEASE Right 1/11/2023     Procedure: RELEASE, CARPAL TUNNEL;  Surgeon: Hardy Delgado MD;  Location: [facility];  Service: Orthopedics;  Laterality: Right;    CHONDROPLASTY OF KNEE  11/13/2019    Procedure: CHONDROPLASTY, KNEE;  Surgeon: Hardy Delgado MD;  Location: [facility] OR;  Service: Orthopedics;;    HAND SURGERY      KNEE ARTHROPLASTY Left 7/27/2020    Procedure: ARTHROPLASTY, KNEE patellofemoral replacement;  Surgeon: Hardy Delgado MD;  Location: [facility] OR;  Service: Orthopedics;  Laterality: Left;  ARTHOSURFACE-NEGRO.  PROCEDURE IS PATELLA FEMORAL REPLACEMENT, NOT TKA    KNEE ARTHROSCOPY W/ MENISCECTOMY Left 11/13/2019    Procedure: ARTHROSCOPY, KNEE, WITH MENISCECTOMY;  Surgeon: Hardy Delgado MD;  Location: [facility] OR;  Service: Orthopedics;  Laterality: Left;    KNEE ARTHROSCOPY W/ MENISCECTOMY Left 12/1/2021    Procedure: ARTHROSCOPY, KNEE, WITH MENISCECTOMY;  Surgeon: Hardy Delgado MD;  Location: [facility];  Service: Orthopedics;  Laterality: Left;    SHOULDER SURGERY       Family History   Problem Relation Name Age of Onset    Alcohol abuse Mother      Cirrhosis Mother      Early death Mother      Alcohol abuse Father      Alcohol abuse Sister      Anxiety disorder Sister      Alcohol abuse Brother      Alcohol abuse Maternal Uncle      Drug abuse Maternal Uncle      Suicide Maternal Uncle      Diabetes Maternal Grandmother      Breast cancer Paternal Aunt      Breast cancer Maternal Aunt         Review of Systems   Constitutional:  Positive for fatigue and unexpected weight change (Lost 4 lb of weight). Negative for activity change and appetite change.   Eyes:  Positive for visual disturbance. Negative for discharge and redness.        Denies any double vision, diplopia or blurred vision.   Cardiovascular:  Negative for chest pain, palpitations and leg swelling.   Gastrointestinal:  Positive for constipation (Chronic constipation several medications may be causing that.  Currently on Linzess 72 mcg). Negative for abdominal pain, blood in  "stool, diarrhea, nausea and vomiting.   Endocrine: Positive for polydipsia and polyuria. Negative for polyphagia.        Uncontrolled diabetes and off medications at this point.   Genitourinary:  Negative for difficulty urinating, dysuria, hematuria and menstrual problem.        Menorrhagia and metrorrhagia   Musculoskeletal:  Positive for arthralgias. Negative for joint swelling and neck pain.        Right hand pain.   Neurological:  Positive for dizziness, tremors and syncope. Negative for seizures, speech difficulty, weakness and headaches.        Denies any new onset of slurred speech.  Old right-sided Bell's palsy has been noted   Psychiatric/Behavioral:  Positive for behavioral problems and dysphoric mood. Negative for agitation and confusion. The patient is nervous/anxious.         Longstanding mental and behavioral issues on several psychotropic medications including Abilify, Depakote, hydroxyzine         Objective:      Blood pressure (!) 108/51, pulse (P) 108, height 5' 2" (1.575 m), weight 81.6 kg (180 lb). Body mass index is 32.92 kg/m².  Physical Exam  Vitals and nursing note reviewed.   Constitutional:       General: She is not in acute distress.     Appearance: She is well-developed. She is ill-appearing. She is not diaphoretic.      Comments: BMI 32.92   HENT:      Head: Normocephalic and atraumatic.      Mouth/Throat:      Mouth: Mucous membranes are moist.   Eyes:      Pupils:      Right eye: Pupil is round and reactive.      Left eye: Pupil is round and reactive.      Comments: Horizontal nystagmus bilaterally   Neck:      Trachea: Trachea normal.   Cardiovascular:      Rate and Rhythm: Normal rate and regular rhythm.      Heart sounds: Normal heart sounds, S1 normal and S2 normal.   Pulmonary:      Effort: Pulmonary effort is normal. No respiratory distress.      Breath sounds: Normal breath sounds.   Abdominal:      Palpations: Abdomen is soft. Abdomen is not rigid.      Tenderness: There is no " abdominal tenderness. There is no guarding.   Musculoskeletal:      Cervical back: Neck supple. No muscular tenderness.   Lymphadenopathy:      Cervical: No cervical adenopathy.   Skin:     General: Skin is warm and dry.      Coloration: Skin is not jaundiced or pale.      Findings: No lesion or rash.   Neurological:      Mental Status: She is alert. Mental status is at baseline.      Cranial Nerves: Facial asymmetry (chronic) present.      Comments: Left-sided Bell's palsy.  (previously erroneously written as right-sided. ).   Psychiatric:         Mood and Affect: Affect is flat and inappropriate.         Speech: Speech is delayed.         Behavior: Behavior is slowed. Behavior is cooperative.         Cognition and Memory: Cognition is impaired.      Comments: Incongruent and rather flat affect.  Limited insight.           Assessment:       Lab Visit on 04/15/2024   Component Date Value Ref Range Status    Hemoglobin A1C 04/15/2024 10.9 (H)  4.5 - 6.2 % Final    Estimated Avg Glucose 04/15/2024 266 (H)  68 - 131 mg/dL Final    Sodium 04/15/2024 132 (L)  136 - 145 mmol/L Final    Potassium 04/15/2024 4.2  3.5 - 5.1 mmol/L Final    Chloride 04/15/2024 98  95 - 110 mmol/L Final    CO2 04/15/2024 25  23 - 29 mmol/L Final    Glucose 04/15/2024 395 (H)  70 - 110 mg/dL Final    BUN 04/15/2024 10  6 - 20 mg/dL Final    Creatinine 04/15/2024 0.6  0.5 - 1.4 mg/dL Final    Calcium 04/15/2024 9.2  8.7 - 10.5 mg/dL Final    Total Protein 04/15/2024 6.9  6.0 - 8.4 g/dL Final    Albumin 04/15/2024 3.9  3.5 - 5.2 g/dL Final    Total Bilirubin 04/15/2024 0.5  0.1 - 1.0 mg/dL Final    Alkaline Phosphatase 04/15/2024 101  55 - 135 U/L Final    AST 04/15/2024 66 (H)  10 - 40 U/L Final    ALT 04/15/2024 59 (H)  10 - 44 U/L Final    eGFR 04/15/2024 >60.0  >60 mL/min/1.73 m^2 Final    Anion Gap 04/15/2024 9  8 - 16 mmol/L Final    Cholesterol 04/15/2024 167  120 - 199 mg/dL Final    Triglycerides 04/15/2024 133  30 - 150 mg/dL Final     HDL 04/15/2024 39 (L)  40 - 75 mg/dL Final    LDL Cholesterol 04/15/2024 101.4  63.0 - 159.0 mg/dL Final    HDL/Cholesterol Ratio 04/15/2024 23.4  20.0 - 50.0 % Final    Total Cholesterol/HDL Ratio 04/15/2024 4.3  2.0 - 5.0 Final    Non-HDL Cholesterol 04/15/2024 128  mg/dL Final    Microalbumin, Urine 04/15/2024 20.2 (H)  <19.9 ug/mL Final    Creatinine, Urine 04/15/2024 51.6  15.0 - 325.0 mg/dL Final    Microalb/Creat Ratio 04/15/2024 39.1 (H)  0.0 - 30.0 ug/mg Final    Vit D, 25-Hydroxy 04/15/2024 37  30 - 96 ng/mL Final    WBC 04/15/2024 9.41  3.90 - 12.70 K/uL Final    RBC 04/15/2024 5.20  4.00 - 5.40 M/uL Final    Hemoglobin 04/15/2024 14.8  12.0 - 16.0 g/dL Final    Hematocrit 04/15/2024 44.9  37.0 - 48.5 % Final    MCV 04/15/2024 86  82 - 98 fL Final    MCH 04/15/2024 28.5  27.0 - 31.0 pg Final    MCHC 04/15/2024 33.0  32.0 - 36.0 g/dL Final    RDW 04/15/2024 13.5  11.5 - 14.5 % Final    Platelets 04/15/2024 279  150 - 450 K/uL Final    MPV 04/15/2024 9.8  9.2 - 12.9 fL Final    Immature Granulocytes 04/15/2024 0.3  0.0 - 0.5 % Final    Gran # (ANC) 04/15/2024 5.2  1.8 - 7.7 K/uL Final    Immature Grans (Abs) 04/15/2024 0.03  0.00 - 0.04 K/uL Final    Lymph # 04/15/2024 3.5  1.0 - 4.8 K/uL Final    Mono # 04/15/2024 0.6  0.3 - 1.0 K/uL Final    Eos # 04/15/2024 0.1  0.0 - 0.5 K/uL Final    Baso # 04/15/2024 0.04  0.00 - 0.20 K/uL Final    nRBC 04/15/2024 0  0 /100 WBC Final    Gran % 04/15/2024 55.1  38.0 - 73.0 % Final    Lymph % 04/15/2024 37.4  18.0 - 48.0 % Final    Mono % 04/15/2024 5.8  4.0 - 15.0 % Final    Eosinophil % 04/15/2024 1.0  0.0 - 8.0 % Final    Basophil % 04/15/2024 0.4  0.0 - 1.9 % Final    Differential Method 04/15/2024 Automated   Final       1. Type 2 diabetes mellitus with hyperglycemia, with long-term current use of insulin  Comments:  Had not been taking her insulins and medications off late.  Had given up and slacked off recently.  Will resume slowly.  Orders:  -     pen needle,  "diabetic (BD ULTRA-FINE SHORT PEN NEEDLE) 31 gauge x 5/16" Ndle; Inject 1 Needle into the skin 2 (two) times a day.  Dispense: 100 each; Refill: 5  -     Ambulatory referral/consult to Nutrition Services; Future; Expected date: 04/22/2024    2. Right hand pain  Comments:  Right hand pain.  Suspect arthritis versus carpal tunnel.  Referral to Dr. Delgado.  Orders:  -     Ambulatory referral/consult to Orthopedics; Future; Expected date: 04/22/2024             Plan:   Type 2 diabetes mellitus with hyperglycemia, with long-term current use of insulin  Comments:  Had not been taking her insulins and medications off late.  Had given up and slacked off recently.  Will resume slowly.  Orders:  -     pen needle, diabetic (BD ULTRA-FINE SHORT PEN NEEDLE) 31 gauge x 5/16" Ndle; Inject 1 Needle into the skin 2 (two) times a day.  Dispense: 100 each; Refill: 5  -     Ambulatory referral/consult to Nutrition Services; Future; Expected date: 04/22/2024    Right hand pain  Comments:  Right hand pain.  Suspect arthritis versus carpal tunnel.  Referral to Dr. Delgado.  Orders:  -     Ambulatory referral/consult to Orthopedics; Future; Expected date: 04/22/2024      Referral for Dr. Delgado has been given.      Not sure if she has recurrence of carpal tunnel or some arthritis.      Her diabetes seems to be totally out of control with the A1c level greater than 10.  She needs to resume her insulin which she was not done.  As to why she did not do remains unclear.  Probably just frustrated with too many medications.      Follow up in about 23 days (around 5/8/2024), or if symptoms worsen or fail to improve, for Diabetes/HTN/Lipids.      Current Outpatient Medications:     albuterol-ipratropium (DUO-NEB) 2.5 mg-0.5 mg/3 mL nebulizer solution, Take 3 mLs by nebulization every 6 (six) hours as needed for Wheezing. Rescue, Disp: 120 each, Rfl: 4    arformoteroL (BROVANA) 15 mcg/2 mL Nebu, Take 2 mLs (15 mcg total) by nebulization every 12 " (twelve) hours. Controller, Disp: 120 mL, Rfl: 6    ARIPiprazole (ABILIFY) 5 MG Tab, Take 5 mg by mouth once daily., Disp: , Rfl:     atorvastatin (LIPITOR) 40 MG tablet, TAKE 1 TABLET BY MOUTH ONCE DAILY IN THE EVENING, Disp: 90 tablet, Rfl: 1    azelastine (ASTELIN) 137 mcg (0.1 %) nasal spray, 1 spray (137 mcg total) by Nasal route 2 (two) times daily., Disp: 30 mL, Rfl: 5    blood sugar diagnostic Strp, 1 strip by Misc.(Non-Drug; Combo Route) route 3 (three) times daily. Contour next EZ test strips, Disp: 50 strip, Rfl: 6    blood-glucose sensor (DEXCOM G7 SENSOR) Tanvi, 1 Device by Misc.(Non-Drug; Combo Route) route every 10 days., Disp: 3 each, Rfl: 5    cetirizine (ZYRTEC) 10 MG tablet, Take 1 tablet by mouth once daily, Disp: 30 tablet, Rfl: 2    CHILDREN'S ASPIRIN 81 mg Chew, Take 81 mg by mouth., Disp: , Rfl:     diclofenac (VOLTAREN) 75 MG EC tablet, Take 1 tablet (75 mg total) by mouth 2 (two) times daily as needed (back pain)., Disp: 30 tablet, Rfl: 2    divalproex ER (DEPAKOTE) 500 MG Tb24, Take 1,000 mg by mouth once daily. , Disp: , Rfl: 2    ergocalciferol (ERGOCALCIFEROL) 50,000 unit Cap, Take 1 capsule (50,000 Units total) by mouth every 7 days., Disp: 4 capsule, Rfl: 5    fluticasone propionate (FLONASE) 50 mcg/actuation nasal spray, Use 1 spray(s) in each nostril twice daily, Disp: 16 g, Rfl: 5    gabapentin (NEURONTIN) 300 MG capsule, Take 1 capsule (300 mg total) by mouth every evening., Disp: 30 capsule, Rfl: 2    hydrOXYzine pamoate (VISTARIL) 25 MG Cap, Take 1 capsule by mouth nightly as needed., Disp: , Rfl: 1    insulin (LANTUS SOLOSTAR U-100 INSULIN) glargine 100 units/mL SubQ pen, INJECT 65 UNITS SUBCUTANEOUSLY IN THE MORNING  AND 65 UNITS SUBCUTANEOUSLY IN THE EVENING ,MAY  TITRATE  UP  2  UNITS  PER  WEEK  TO  REACH  FASTING  GLUCOSE  OF  110. Max dose 85 units daily, Disp: 30 mL, Rfl: 2    lancets 32 gauge Misc, 1 lancet by Misc.(Non-Drug; Combo Route) route 3 (three) times daily as  "needed., Disp: 100 each, Rfl: 5    linaCLOtide (LINZESS) 72 mcg Cap capsule, Take 1 capsule (72 mcg total) by mouth before breakfast., Disp: 30 capsule, Rfl: 5    montelukast (SINGULAIR) 10 mg tablet, Take 1 tablet (10 mg total) by mouth every evening., Disp: 30 tablet, Rfl: 6    nebulizer and compressor (COMP-AIR NEBULIZER COMPRESSOR) Tanvi, To be used to nebulize medication, Disp: 1 each, Rfl: 0    norgestimate-ethinyl estradioL (ORTHO-CYCLEN) 0.25-35 mg-mcg per tablet, Take 1 tablet by mouth once daily., Disp: 28 tablet, Rfl: 11    ondansetron (ZOFRAN-ODT) 4 MG TbDL, Take 1 tablet (4 mg total) by mouth every 6 (six) hours as needed (nausea)., Disp: 30 tablet, Rfl: 1    primidone (MYSOLINE) 50 MG Tab, Take 100 mg by mouth 3 (three) times daily., Disp: , Rfl:     sertraline (ZOLOFT) 50 MG tablet, Take 50 mg by mouth., Disp: , Rfl:     sumatriptan (IMITREX) 100 MG tablet, Take 1 tablet (100 mg total) by mouth as needed (Take at onset of the headache and repeat 2 hours later if needed.  Max 2 tabs in 24 hours.)., Disp: 15 tablet, Rfl: 5    TENS unit and electrodes Cmpk, 1 each by Misc.(Non-Drug; Combo Route) route once daily., Disp: 1 each, Rfl: 0    tiZANidine (ZANAFLEX) 4 MG tablet, Take 1 tablet (4 mg total) by mouth every 8 (eight) hours., Disp: 90 tablet, Rfl: 5    traZODone (DESYREL) 100 MG tablet, Take 150 mg by mouth nightly as needed., Disp: , Rfl:     blood-glucose meter kit, Use as instructed, Disp: 1 each, Rfl: 0    pen needle, diabetic (BD ULTRA-FINE SHORT PEN NEEDLE) 31 gauge x 5/16" Ndle, Inject 1 Needle into the skin 2 (two) times a day., Disp: 100 each, Rfl: 5    Jaydon Marlen      "

## 2024-05-08 ENCOUNTER — OFFICE VISIT (OUTPATIENT)
Dept: FAMILY MEDICINE | Facility: CLINIC | Age: 49
End: 2024-05-08
Payer: MEDICAID

## 2024-05-08 VITALS
HEIGHT: 62 IN | SYSTOLIC BLOOD PRESSURE: 102 MMHG | BODY MASS INDEX: 34.04 KG/M2 | WEIGHT: 185 LBS | DIASTOLIC BLOOD PRESSURE: 68 MMHG

## 2024-05-08 DIAGNOSIS — E11.65 TYPE 2 DIABETES MELLITUS WITH HYPERGLYCEMIA, WITH LONG-TERM CURRENT USE OF INSULIN: Primary | Chronic | ICD-10-CM

## 2024-05-08 DIAGNOSIS — Z79.4 TYPE 2 DIABETES MELLITUS WITH HYPERGLYCEMIA, WITH LONG-TERM CURRENT USE OF INSULIN: Primary | Chronic | ICD-10-CM

## 2024-05-08 PROCEDURE — 3008F BODY MASS INDEX DOCD: CPT | Mod: CPTII,,, | Performed by: INTERNAL MEDICINE

## 2024-05-08 PROCEDURE — 99213 OFFICE O/P EST LOW 20 MIN: CPT | Mod: S$PBB,,, | Performed by: INTERNAL MEDICINE

## 2024-05-08 PROCEDURE — 3066F NEPHROPATHY DOC TX: CPT | Mod: CPTII,,, | Performed by: INTERNAL MEDICINE

## 2024-05-08 PROCEDURE — 3074F SYST BP LT 130 MM HG: CPT | Mod: CPTII,,, | Performed by: INTERNAL MEDICINE

## 2024-05-08 PROCEDURE — 99999 PR PBB SHADOW E&M-EST. PATIENT-LVL III: CPT | Mod: PBBFAC,,, | Performed by: INTERNAL MEDICINE

## 2024-05-08 PROCEDURE — 99213 OFFICE O/P EST LOW 20 MIN: CPT | Mod: PBBFAC,PN | Performed by: INTERNAL MEDICINE

## 2024-05-08 PROCEDURE — 3060F POS MICROALBUMINURIA REV: CPT | Mod: CPTII,,, | Performed by: INTERNAL MEDICINE

## 2024-05-08 PROCEDURE — 1159F MED LIST DOCD IN RCRD: CPT | Mod: CPTII,,, | Performed by: INTERNAL MEDICINE

## 2024-05-08 PROCEDURE — 3046F HEMOGLOBIN A1C LEVEL >9.0%: CPT | Mod: CPTII,,, | Performed by: INTERNAL MEDICINE

## 2024-05-08 PROCEDURE — 1160F RVW MEDS BY RX/DR IN RCRD: CPT | Mod: CPTII,,, | Performed by: INTERNAL MEDICINE

## 2024-05-08 PROCEDURE — 3078F DIAST BP <80 MM HG: CPT | Mod: CPTII,,, | Performed by: INTERNAL MEDICINE

## 2024-05-08 RX ORDER — SEMAGLUTIDE 0.68 MG/ML
0.5 INJECTION, SOLUTION SUBCUTANEOUS
Qty: 3 ML | Refills: 5 | Status: SHIPPED | OUTPATIENT
Start: 2024-05-08

## 2024-05-08 NOTE — PROGRESS NOTES
Subjective:       Patient ID: Nicole Coker is a 49 y.o. female.    Chief Complaint: Diabetes    Patient is a 49-year-old female who comes for follow-up.  Initially it was written that she is here for back pain and she needed a work letter.  But it has been clarified that she does not work and she does not need any letter.    She is following up with the back specialist and she has been recommended physical therapy.  The physical therapist out of network.    As far as diabetes is concerned, her hemoglobin A1c has gone above 10.    She states that she is taking the Trulicity but did not recall the dose.  It is 4.5 mg as was prescribed by miss Santiagoyuval Sands last year.  Perhaps she might be coming to the end of dosing.    She states that she feels that she will do well on Ozempic.  Her mother-in-law takes Ozempic and apparently she was done wonders.    I am not sure about the coverage of Ozempic from her insurance.    Diabetes  She has type 2 diabetes mellitus. MedicAlert identification noted. The initial diagnosis of diabetes was made 15 years ago. Her disease course has been worsening. Associated symptoms include polydipsia and polyuria. Pertinent negatives for hypoglycemia complications include no blackouts, no hospitalization, no nocturnal hypoglycemia, no required assistance and no required glucagon injection. Risk factors for coronary artery disease include obesity. Current diabetic treatment includes insulin injections. She is compliant with treatment none of the time. She is currently taking insulin pre-breakfast and at bedtime. Insulin injections are given by patient. Rotation sites for injection include the abdominal wall. Her weight is increasing rapidly. She is following a diabetic diet. When asked about meal planning, she reported none. She has not had a previous visit with a dietitian. She never participates in exercise. She monitors blood glucose at home 3-4 x per day. She monitors urine at home <1 x  per month. Blood glucose monitoring compliance is excellent. There is no change in her home blood glucose trend. She sees a podiatrist.Eye exam is not current.       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   Tobacco Use    Smoking status: Former     Current packs/day: 0.00     Average packs/day: 0.3 packs/day for 1 year (0.3 ttl pk-yrs)     Types: Cigars, Cigarettes     Start date: 4/6/2020     Quit date: 4/6/2021     Years since quitting: 3.0    Smokeless tobacco: Never   Substance and Sexual Activity    Alcohol use: Not Currently    Drug use: No    Sexual activity: Yes     Partners: Male     Social Determinants of Health     Financial Resource Strain: Low Risk  (12/13/2023)    Overall Financial Resource Strain (CARDIA)     Difficulty of Paying Living Expenses: Not hard at all   Food Insecurity: No Food Insecurity (12/13/2023)    Hunger Vital Sign     Worried About Running Out of Food in the Last Year: Never true     Ran Out of Food in the Last Year: Never true   Transportation Needs: No Transportation Needs (12/13/2023)    PRAPARE - Transportation     Lack of Transportation (Medical): No     Lack of Transportation (Non-Medical): No   Physical Activity: Inactive (12/13/2023)    Exercise Vital Sign     Days of Exercise per Week: 4 days     Minutes of Exercise per Session: 0 min   Stress:  Stress Concern Present (12/13/2023)    [employer] - Occupational Stress Questionnaire     Feeling of Stress : To some extent   Housing Stability: High Risk (12/13/2023)    Housing Stability Vital Sign     Unable to Pay for Housing in the Last Year: No     Number of Places Lived in the Last Year: 9     Unstable Housing in the Last Year: No     Past Surgical History:   Procedure Laterality Date    ARTHROSCOPY OF KNEE Left 7/27/2020    Procedure: ARTHROSCOPY, KNEE;  Surgeon: Hardy Delgado MD;  Location: [facility] OR;  Service: Orthopedics;  Laterality: Left;    CARPAL TUNNEL RELEASE Right 1/11/2023    Procedure: RELEASE, CARPAL TUNNEL;  Surgeon: Hardy Delgado MD;  Location: [facility] OR;  Service: Orthopedics;  Laterality: Right;    CHONDROPLASTY OF KNEE  11/13/2019    Procedure: CHONDROPLASTY, KNEE;  Surgeon: Hardy Delgado MD;  Location: [facility];  Service: Orthopedics;;    HAND SURGERY      KNEE ARTHROPLASTY Left 7/27/2020    Procedure: ARTHROPLASTY, KNEE patellofemoral replacement;  Surgeon: Hardy Delgado MD;  Location: [facility];  Service: Orthopedics;  Laterality: Left;  ARTHOSURFACE-NEGRO.  PROCEDURE IS PATELLA FEMORAL REPLACEMENT, NOT TKA    KNEE ARTHROSCOPY W/ MENISCECTOMY Left 11/13/2019    Procedure: ARTHROSCOPY, KNEE, WITH MENISCECTOMY;  Surgeon: Hardy Delgado MD;  Location: [facility];  Service: Orthopedics;  Laterality: Left;    KNEE ARTHROSCOPY W/ MENISCECTOMY Left 12/1/2021    Procedure: ARTHROSCOPY, KNEE, WITH MENISCECTOMY;  Surgeon: Hardy Delgado MD;  Location: [facility];  Service: Orthopedics;  Laterality: Left;    SHOULDER SURGERY       Family History   Problem Relation Name Age of Onset    Alcohol abuse Mother      Cirrhosis Mother      Early death Mother      Alcohol abuse Father      Alcohol abuse Sister      Anxiety disorder Sister      Alcohol abuse Brother      Alcohol abuse Maternal Uncle      Drug abuse Maternal Uncle      Suicide Maternal Uncle      Diabetes Maternal  "Grandmother      Breast cancer Paternal Aunt      Breast cancer Maternal Aunt         Review of Systems   Constitutional:  Negative for activity change.   Respiratory: Negative.     Cardiovascular: Negative.    Gastrointestinal: Negative.    Endocrine: Positive for polydipsia and polyuria.        Poorly-controlled diabetes   Genitourinary:  Negative for dysuria.   Musculoskeletal:  Positive for back pain.         Objective:      Blood pressure 102/68, pulse (P) 82, height 5' 2" (1.575 m), weight 83.9 kg (185 lb). Body mass index is 33.84 kg/m².  Physical Exam  Cardiovascular:      Rate and Rhythm: Normal rate and regular rhythm.      Pulses: Normal pulses.      Heart sounds: Normal heart sounds.   Pulmonary:      Effort: Pulmonary effort is normal.      Breath sounds: Normal breath sounds.   Abdominal:      General: There is no distension.      Tenderness: There is no abdominal tenderness.   Musculoskeletal:         General: No swelling or tenderness.   Skin:     General: Skin is warm.   Neurological:      Mental Status: She is alert. Mental status is at baseline.           Assessment:       Lab Visit on 04/15/2024   Component Date Value Ref Range Status    Hemoglobin A1C 04/15/2024 10.9 (H)  4.5 - 6.2 % Final    Estimated Avg Glucose 04/15/2024 266 (H)  68 - 131 mg/dL Final    Sodium 04/15/2024 132 (L)  136 - 145 mmol/L Final    Potassium 04/15/2024 4.2  3.5 - 5.1 mmol/L Final    Chloride 04/15/2024 98  95 - 110 mmol/L Final    CO2 04/15/2024 25  23 - 29 mmol/L Final    Glucose 04/15/2024 395 (H)  70 - 110 mg/dL Final    BUN 04/15/2024 10  6 - 20 mg/dL Final    Creatinine 04/15/2024 0.6  0.5 - 1.4 mg/dL Final    Calcium 04/15/2024 9.2  8.7 - 10.5 mg/dL Final    Total Protein 04/15/2024 6.9  6.0 - 8.4 g/dL Final    Albumin 04/15/2024 3.9  3.5 - 5.2 g/dL Final    Total Bilirubin 04/15/2024 0.5  0.1 - 1.0 mg/dL Final    Alkaline Phosphatase 04/15/2024 101  55 - 135 U/L Final    AST 04/15/2024 66 (H)  10 - 40 U/L Final "    ALT 04/15/2024 59 (H)  10 - 44 U/L Final    eGFR 04/15/2024 >60.0  >60 mL/min/1.73 m^2 Final    Anion Gap 04/15/2024 9  8 - 16 mmol/L Final    Cholesterol 04/15/2024 167  120 - 199 mg/dL Final    Triglycerides 04/15/2024 133  30 - 150 mg/dL Final    HDL 04/15/2024 39 (L)  40 - 75 mg/dL Final    LDL Cholesterol 04/15/2024 101.4  63.0 - 159.0 mg/dL Final    HDL/Cholesterol Ratio 04/15/2024 23.4  20.0 - 50.0 % Final    Total Cholesterol/HDL Ratio 04/15/2024 4.3  2.0 - 5.0 Final    Non-HDL Cholesterol 04/15/2024 128  mg/dL Final    Microalbumin, Urine 04/15/2024 20.2 (H)  <19.9 ug/mL Final    Creatinine, Urine 04/15/2024 51.6  15.0 - 325.0 mg/dL Final    Microalb/Creat Ratio 04/15/2024 39.1 (H)  0.0 - 30.0 ug/mg Final    Vit D, 25-Hydroxy 04/15/2024 37  30 - 96 ng/mL Final    WBC 04/15/2024 9.41  3.90 - 12.70 K/uL Final    RBC 04/15/2024 5.20  4.00 - 5.40 M/uL Final    Hemoglobin 04/15/2024 14.8  12.0 - 16.0 g/dL Final    Hematocrit 04/15/2024 44.9  37.0 - 48.5 % Final    MCV 04/15/2024 86  82 - 98 fL Final    MCH 04/15/2024 28.5  27.0 - 31.0 pg Final    MCHC 04/15/2024 33.0  32.0 - 36.0 g/dL Final    RDW 04/15/2024 13.5  11.5 - 14.5 % Final    Platelets 04/15/2024 279  150 - 450 K/uL Final    MPV 04/15/2024 9.8  9.2 - 12.9 fL Final    Immature Granulocytes 04/15/2024 0.3  0.0 - 0.5 % Final    Gran # (ANC) 04/15/2024 5.2  1.8 - 7.7 K/uL Final    Immature Grans (Abs) 04/15/2024 0.03  0.00 - 0.04 K/uL Final    Lymph # 04/15/2024 3.5  1.0 - 4.8 K/uL Final    Mono # 04/15/2024 0.6  0.3 - 1.0 K/uL Final    Eos # 04/15/2024 0.1  0.0 - 0.5 K/uL Final    Baso # 04/15/2024 0.04  0.00 - 0.20 K/uL Final    nRBC 04/15/2024 0  0 /100 WBC Final    Gran % 04/15/2024 55.1  38.0 - 73.0 % Final    Lymph % 04/15/2024 37.4  18.0 - 48.0 % Final    Mono % 04/15/2024 5.8  4.0 - 15.0 % Final    Eosinophil % 04/15/2024 1.0  0.0 - 8.0 % Final    Basophil % 04/15/2024 0.4  0.0 - 1.9 % Final    Differential Method 04/15/2024 Automated   Final        1. Type 2 diabetes mellitus with hyperglycemia, with long-term current use of insulin  Comments:  Patient used to be on Trulicity 4.5 mg with no relief.  Change to Ozempic and start with 0.5 and will go soon to 1 or 2.  Orders:  -     Ambulatory referral/consult to Endocrinology; Future; Expected date: 06/08/2024  -     semaglutide (OZEMPIC) 0.25 mg or 0.5 mg (2 mg/3 mL) pen injector; Inject 0.5 mg into the skin every 7 days.  Dispense: 3 mL; Refill: 5             Plan:   Type 2 diabetes mellitus with hyperglycemia, with long-term current use of insulin  Comments:  Patient used to be on Trulicity 4.5 mg with no relief.  Change to Ozempic and start with 0.5 and will go soon to 1 or 2.  Orders:  -     Ambulatory referral/consult to Endocrinology; Future; Expected date: 06/08/2024  -     semaglutide (OZEMPIC) 0.25 mg or 0.5 mg (2 mg/3 mL) pen injector; Inject 0.5 mg into the skin every 7 days.  Dispense: 3 mL; Refill: 5      Patient's diabetes continues to be poorly controlled.      Probably follow-up and compliance is very challenging and limited.      She raises an interesting question about insulin pump.  I doubt she will be able to manage a pump and astutely deliver the insulin and she may make mistakes leading to hypoglycemic reaction.  She did not even recall the dose of Trulicity.    I will change to Ozempic at 0.5 and I am sure it this will not help but we may have to increase it.      She will need a little bit more help from diabetic experts.  I will make a referral.      Follow-up in 6 weeks.  Follow up in about 2 months (around 7/8/2024), or if symptoms worsen or fail to improve, for diabtes.      Current Outpatient Medications:     albuterol-ipratropium (DUO-NEB) 2.5 mg-0.5 mg/3 mL nebulizer solution, Take 3 mLs by nebulization every 6 (six) hours as needed for Wheezing. Rescue, Disp: 120 each, Rfl: 4    arformoteroL (BROVANA) 15 mcg/2 mL Nebu, Take 2 mLs (15 mcg total) by nebulization every 12  (twelve) hours. Controller, Disp: 120 mL, Rfl: 6    ARIPiprazole (ABILIFY) 5 MG Tab, Take 5 mg by mouth once daily., Disp: , Rfl:     atorvastatin (LIPITOR) 40 MG tablet, TAKE 1 TABLET BY MOUTH ONCE DAILY IN THE EVENING, Disp: 90 tablet, Rfl: 1    azelastine (ASTELIN) 137 mcg (0.1 %) nasal spray, 1 spray (137 mcg total) by Nasal route 2 (two) times daily., Disp: 30 mL, Rfl: 5    blood sugar diagnostic Strp, 1 strip by Misc.(Non-Drug; Combo Route) route 3 (three) times daily. Contour next EZ test strips, Disp: 50 strip, Rfl: 6    blood-glucose sensor (DEXCOM G7 SENSOR) Tanvi, 1 Device by Misc.(Non-Drug; Combo Route) route every 10 days., Disp: 3 each, Rfl: 5    cetirizine (ZYRTEC) 10 MG tablet, Take 1 tablet by mouth once daily, Disp: 30 tablet, Rfl: 2    CHILDREN'S ASPIRIN 81 mg Chew, Take 81 mg by mouth., Disp: , Rfl:     diclofenac (VOLTAREN) 75 MG EC tablet, Take 1 tablet (75 mg total) by mouth 2 (two) times daily as needed (back pain)., Disp: 30 tablet, Rfl: 2    divalproex ER (DEPAKOTE) 500 MG Tb24, Take 1,000 mg by mouth once daily. , Disp: , Rfl: 2    ergocalciferol (ERGOCALCIFEROL) 50,000 unit Cap, Take 1 capsule (50,000 Units total) by mouth every 7 days., Disp: 4 capsule, Rfl: 5    fluticasone propionate (FLONASE) 50 mcg/actuation nasal spray, Use 1 spray(s) in each nostril twice daily, Disp: 16 g, Rfl: 5    gabapentin (NEURONTIN) 300 MG capsule, Take 1 capsule (300 mg total) by mouth every evening., Disp: 30 capsule, Rfl: 2    hydrOXYzine pamoate (VISTARIL) 25 MG Cap, Take 1 capsule by mouth nightly as needed., Disp: , Rfl: 1    insulin (LANTUS SOLOSTAR U-100 INSULIN) glargine 100 units/mL SubQ pen, INJECT 65 UNITS SUBCUTANEOUSLY IN THE MORNING  AND 65 UNITS SUBCUTANEOUSLY IN THE EVENING ,MAY  TITRATE  UP  2  UNITS  PER  WEEK  TO  REACH  FASTING  GLUCOSE  OF  110. Max dose 85 units daily, Disp: 30 mL, Rfl: 2    lancets 32 gauge Misc, 1 lancet by Misc.(Non-Drug; Combo Route) route 3 (three) times daily as  "needed., Disp: 100 each, Rfl: 5    linaCLOtide (LINZESS) 72 mcg Cap capsule, Take 1 capsule (72 mcg total) by mouth before breakfast., Disp: 30 capsule, Rfl: 5    montelukast (SINGULAIR) 10 mg tablet, Take 1 tablet (10 mg total) by mouth every evening., Disp: 30 tablet, Rfl: 6    nebulizer and compressor (COMP-AIR NEBULIZER COMPRESSOR) Tanvi, To be used to nebulize medication, Disp: 1 each, Rfl: 0    norgestimate-ethinyl estradioL (ORTHO-CYCLEN) 0.25-35 mg-mcg per tablet, Take 1 tablet by mouth once daily., Disp: 28 tablet, Rfl: 11    ondansetron (ZOFRAN-ODT) 4 MG TbDL, Take 1 tablet (4 mg total) by mouth every 6 (six) hours as needed (nausea)., Disp: 30 tablet, Rfl: 1    pen needle, diabetic (BD ULTRA-FINE SHORT PEN NEEDLE) 31 gauge x 5/16" Ndle, Inject 1 Needle into the skin 2 (two) times a day., Disp: 100 each, Rfl: 5    primidone (MYSOLINE) 50 MG Tab, Take 100 mg by mouth 3 (three) times daily., Disp: , Rfl:     sertraline (ZOLOFT) 50 MG tablet, Take 50 mg by mouth., Disp: , Rfl:     sumatriptan (IMITREX) 100 MG tablet, Take 1 tablet (100 mg total) by mouth as needed (Take at onset of the headache and repeat 2 hours later if needed.  Max 2 tabs in 24 hours.)., Disp: 15 tablet, Rfl: 5    TENS unit and electrodes Cmpk, 1 each by Misc.(Non-Drug; Combo Route) route once daily., Disp: 1 each, Rfl: 0    tiZANidine (ZANAFLEX) 4 MG tablet, Take 1 tablet (4 mg total) by mouth every 8 (eight) hours., Disp: 90 tablet, Rfl: 5    traZODone (DESYREL) 100 MG tablet, Take 150 mg by mouth nightly as needed., Disp: , Rfl:     blood-glucose meter kit, Use as instructed, Disp: 1 each, Rfl: 0    semaglutide (OZEMPIC) 0.25 mg or 0.5 mg (2 mg/3 mL) pen injector, Inject 0.5 mg into the skin every 7 days., Disp: 3 mL, Rfl: 5    Jaydon Gee      "

## 2024-05-15 ENCOUNTER — HOSPITAL ENCOUNTER (OUTPATIENT)
Dept: RADIOLOGY | Facility: HOSPITAL | Age: 49
Discharge: HOME | End: 2024-05-15
Attending: NURSE PRACTITIONER
Payer: COMMERCIAL

## 2024-05-15 ENCOUNTER — OFFICE VISIT (OUTPATIENT)
Dept: PULMONOLOGY | Facility: CLINIC | Age: 49
End: 2024-05-15
Payer: COMMERCIAL

## 2024-05-15 ENCOUNTER — TELEPHONE (OUTPATIENT)
Dept: PULMONOLOGY | Facility: CLINIC | Age: 49
End: 2024-05-15

## 2024-05-15 VITALS
SYSTOLIC BLOOD PRESSURE: 106 MMHG | DIASTOLIC BLOOD PRESSURE: 64 MMHG | WEIGHT: 182.38 LBS | BODY MASS INDEX: 33.36 KG/M2 | HEART RATE: 106 BPM | OXYGEN SATURATION: 99 %

## 2024-05-15 DIAGNOSIS — G51.0 FACIAL PALSY: ICD-10-CM

## 2024-05-15 DIAGNOSIS — R05.9 COUGH, UNSPECIFIED TYPE: ICD-10-CM

## 2024-05-15 DIAGNOSIS — J45.40 MODERATE PERSISTENT ASTHMA, UNSPECIFIED WHETHER COMPLICATED: Primary | ICD-10-CM

## 2024-05-15 PROCEDURE — 71046 X-RAY EXAM CHEST 2 VIEWS: CPT | Mod: TC

## 2024-05-15 PROCEDURE — 3074F SYST BP LT 130 MM HG: CPT | Mod: CPTII,S$GLB,, | Performed by: NURSE PRACTITIONER

## 2024-05-15 PROCEDURE — 99213 OFFICE O/P EST LOW 20 MIN: CPT | Mod: S$GLB,,, | Performed by: NURSE PRACTITIONER

## 2024-05-15 PROCEDURE — 71046 X-RAY EXAM CHEST 2 VIEWS: CPT | Mod: 26,,, | Performed by: RADIOLOGY

## 2024-05-15 PROCEDURE — 3008F BODY MASS INDEX DOCD: CPT | Mod: CPTII,S$GLB,, | Performed by: NURSE PRACTITIONER

## 2024-05-15 PROCEDURE — 3078F DIAST BP <80 MM HG: CPT | Mod: CPTII,S$GLB,, | Performed by: NURSE PRACTITIONER

## 2024-05-15 PROCEDURE — 3060F POS MICROALBUMINURIA REV: CPT | Mod: CPTII,S$GLB,, | Performed by: NURSE PRACTITIONER

## 2024-05-15 PROCEDURE — 1159F MED LIST DOCD IN RCRD: CPT | Mod: CPTII,S$GLB,, | Performed by: NURSE PRACTITIONER

## 2024-05-15 PROCEDURE — 3066F NEPHROPATHY DOC TX: CPT | Mod: CPTII,S$GLB,, | Performed by: NURSE PRACTITIONER

## 2024-05-15 PROCEDURE — 3046F HEMOGLOBIN A1C LEVEL >9.0%: CPT | Mod: CPTII,S$GLB,, | Performed by: NURSE PRACTITIONER

## 2024-05-15 RX ORDER — MONTELUKAST SODIUM 10 MG/1
10 TABLET ORAL NIGHTLY
Qty: 30 TABLET | Refills: 6 | Status: SHIPPED | OUTPATIENT
Start: 2024-05-15

## 2024-05-15 NOTE — PROGRESS NOTES
SUBJECTIVE:    Patient ID: Nicolepj Coker is a 49 y.o. female.    Chief Complaint: Follow-up (6 month follow up asthma)      Patient here today feeling like she is more short of breath over the last month.  She feels it is related to pollen. She states she has also been more anxious lately which can be influencing her breathing.  She is using Brovana and Budesonide twice a day and duoneb three times a day.  She does use the Duoneb as needed at times when she is wheezing.  She does wheeze some at night. She takes the Singulair at night.     She is not able to use inhalers effectively due to facial palsy.    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 OR;  Service: Orthopedics;  Laterality: Left;    CARPAL TUNNEL RELEASE Right 1/11/2023    Procedure: RELEASE, CARPAL TUNNEL;  Surgeon: Hardy Delgado MD;  Location: [facility] OR;  Service: Orthopedics;  Laterality: Right;    CHONDROPLASTY OF KNEE  11/13/2019    Procedure: CHONDROPLASTY, KNEE;  Surgeon: Hardy Delgado MD;  Location: [facility] OR;  Service: Orthopedics;;    HAND SURGERY      KNEE ARTHROPLASTY Left 7/27/2020    Procedure: ARTHROPLASTY, KNEE patellofemoral replacement;  Surgeon: Hardy Dlegado MD;  Location: [facility] OR;  Service: Orthopedics;  Laterality: Left;  ARTHOSURFACE-NEGRO.  PROCEDURE IS PATELLA FEMORAL REPLACEMENT, NOT TKA    KNEE ARTHROSCOPY W/ MENISCECTOMY Left  11/13/2019    Procedure: ARTHROSCOPY, KNEE, WITH MENISCECTOMY;  Surgeon: Hardy Delgado MD;  Location: [facility] OR;  Service: Orthopedics;  Laterality: Left;    KNEE ARTHROSCOPY W/ MENISCECTOMY Left 12/1/2021    Procedure: ARTHROSCOPY, KNEE, WITH MENISCECTOMY;  Surgeon: Hardy Delgado MD;  Location: [facility] OR;  Service: Orthopedics;  Laterality: Left;    SHOULDER SURGERY       Family History   Problem Relation Name Age of Onset    Alcohol abuse Mother      Cirrhosis Mother      Early death Mother      Alcohol abuse Father      Alcohol abuse Sister      Anxiety disorder Sister      Alcohol abuse Brother      Alcohol abuse Maternal Uncle      Drug abuse Maternal Uncle      Suicide Maternal Uncle      Diabetes Maternal Grandmother      Breast cancer Paternal Aunt      Breast cancer Maternal Aunt          Social History:   Marital Status:   Occupation: Data Unavailable  Alcohol History:  reports that she does not currently use alcohol.  Tobacco History:  reports that she quit smoking about 3 years ago. Her smoking use included cigars and cigarettes. She started smoking about 4 years ago. She has a 0.3 pack-year smoking history. She has never used smokeless tobacco.  Drug History:  reports no history of drug use.    Review of patient's allergies indicates:   Allergen Reactions    Oxycodone-acetaminophen Hives       Current Outpatient Medications   Medication Sig Dispense Refill    albuterol-ipratropium (DUO-NEB) 2.5 mg-0.5 mg/3 mL nebulizer solution Take 3 mLs by nebulization every 6 (six) hours as needed for Wheezing. Rescue 120 each 4    arformoteroL (BROVANA) 15 mcg/2 mL Nebu Take 2 mLs (15 mcg total) by nebulization every 12 (twelve) hours. Controller 120 mL 6    ARIPiprazole (ABILIFY) 5 MG Tab Take 5 mg by mouth once daily.      atorvastatin (LIPITOR) 40 MG tablet TAKE 1 TABLET BY MOUTH ONCE DAILY IN THE EVENING 90 tablet 1    azelastine (ASTELIN) 137 mcg (0.1 %) nasal spray 1 spray (137 mcg total) by Nasal route 2  (two) times daily. 30 mL 5    cetirizine (ZYRTEC) 10 MG tablet Take 1 tablet by mouth once daily 30 tablet 2    CHILDREN'S ASPIRIN 81 mg Chew Take 81 mg by mouth.      diclofenac (VOLTAREN) 75 MG EC tablet Take 1 tablet (75 mg total) by mouth 2 (two) times daily as needed (back pain). 30 tablet 2    divalproex ER (DEPAKOTE) 500 MG Tb24 Take 1,000 mg by mouth once daily.   2    ergocalciferol (ERGOCALCIFEROL) 50,000 unit Cap Take 1 capsule (50,000 Units total) by mouth every 7 days. 4 capsule 5    fluticasone propionate (FLONASE) 50 mcg/actuation nasal spray Use 1 spray(s) in each nostril twice daily 16 g 5    gabapentin (NEURONTIN) 300 MG capsule Take 1 capsule (300 mg total) by mouth every evening. 30 capsule 2    hydrOXYzine pamoate (VISTARIL) 25 MG Cap Take 1 capsule by mouth nightly as needed.  1    insulin (LANTUS SOLOSTAR U-100 INSULIN) glargine 100 units/mL SubQ pen INJECT 65 UNITS SUBCUTANEOUSLY IN THE MORNING  AND 65 UNITS SUBCUTANEOUSLY IN THE EVENING ,MAY  TITRATE  UP  2  UNITS  PER  WEEK  TO  REACH  FASTING  GLUCOSE  OF  110. Max dose 85 units daily 30 mL 2    linaCLOtide (LINZESS) 72 mcg Cap capsule Take 1 capsule (72 mcg total) by mouth before breakfast. 30 capsule 5    norgestimate-ethinyl estradioL (ORTHO-CYCLEN) 0.25-35 mg-mcg per tablet Take 1 tablet by mouth once daily. 28 tablet 11    ondansetron (ZOFRAN-ODT) 4 MG TbDL Take 1 tablet (4 mg total) by mouth every 6 (six) hours as needed (nausea). 30 tablet 1    primidone (MYSOLINE) 50 MG Tab Take 100 mg by mouth 3 (three) times daily.      semaglutide (OZEMPIC) 0.25 mg or 0.5 mg (2 mg/3 mL) pen injector Inject 0.5 mg into the skin every 7 days. 3 mL 5    sertraline (ZOLOFT) 50 MG tablet Take 50 mg by mouth.      sumatriptan (IMITREX) 100 MG tablet Take 1 tablet (100 mg total) by mouth as needed (Take at onset of the headache and repeat 2 hours later if needed.  Max 2 tabs in 24 hours.). 15 tablet 5    tiZANidine (ZANAFLEX) 4 MG tablet Take 1 tablet  "(4 mg total) by mouth every 8 (eight) hours. 90 tablet 5    traZODone (DESYREL) 100 MG tablet Take 150 mg by mouth nightly as needed.      blood sugar diagnostic Strp 1 strip by Misc.(Non-Drug; Combo Route) route 3 (three) times daily. Contour next EZ test strips 50 strip 6    blood-glucose meter kit Use as instructed 1 each 0    blood-glucose sensor (DEXCOM G7 SENSOR) Tanvi 1 Device by Misc.(Non-Drug; Combo Route) route every 10 days. 3 each 5    lancets 32 gauge Misc 1 lancet by Misc.(Non-Drug; Combo Route) route 3 (three) times daily as needed. 100 each 5    montelukast (SINGULAIR) 10 mg tablet Take 1 tablet (10 mg total) by mouth every evening. 30 tablet 6    nebulizer and compressor (COMP-AIR NEBULIZER COMPRESSOR) Tanvi To be used to nebulize medication 1 each 0    pen needle, diabetic (BD ULTRA-FINE SHORT PEN NEEDLE) 31 gauge x 5/16" Ndle Inject 1 Needle into the skin 2 (two) times a day. 100 each 5    TENS unit and electrodes Cmpk 1 each by Misc.(Non-Drug; Combo Route) route once daily. 1 each 0     No current facility-administered medications for this visit.     Echo 10/2023    Left Ventricle: The left ventricle is normal in size. Normal wall thickness. Normal wall motion. There is normal systolic function with a visually estimated ejection fraction of 65 - 70%. There is normal diastolic function.    Right Ventricle: Normal right ventricular cavity size. Wall thickness is normal. Right ventricle wall motion  is normal. Systolic function is normal.    Left Atrium: Left atrium is mildly dilated.    IVC/SVC: Normal venous pressure at 3 mmHg.        Chest xray  IMPRESSION:  Negative for pulmonary embolus or other acute intrathoracic oioyopuasqq03/2021    Review of Systems  General: Feeling Well.  Eyes: Vision is good.  ENT:  drip   Heart:: No chest pain or palpitations.  Lungs: breathing is stable, wheezing at night at times  GI: No Nausea, vomiting, constipation, diarrhea, or reflux.  : reflux better "   Musculoskeletal: No joint pain or myalgias.  Skin: No lesions or rashes.  Neuro: bells palsy with facial paralysis, dizzy at times   Lymph: No edema or adenopathy.  Psych: anxiety and depression   Endo: No weight change.    OBJECTIVE:      /64 (BP Location: Right arm, Patient Position: Sitting, BP Method: Medium (Manual))   Pulse 106   Wt 82.7 kg (182 lb 6.4 oz)   SpO2 99%   BMI 33.36 kg/m²     Physical Exam  GENERAL:middle aged patient in no distress.  HEENT: Pupils equal and reactive. Extraocular movements intact. Nose intact.      Pharynx moist. Left side facial palsy   NECK: Supple.   HEART: Regular rate and rhythm. No murmur or gallop auscultated.  LUNGS: Clear to auscultation and percussion. Lung excursion symmetrical. No change in fremitus. No adventitial noises.  ABDOMEN: Bowel sounds present. Non-tender, no masses palpated.  EXTREMITIES: Normal muscle tone and joint movement, no cyanosis or clubbing.   LYMPHATICS: No adenopathy palpated, no edema.  SKIN: Dry, intact, no lesions.   NEURO: bells palsy to left .  PSYCH: appears anxious .    Assessment:       1. Moderate persistent asthma, unspecified whether complicated    2. Facial palsy    3. Cough, unspecified type               Plan:         Continue the Budesonide twice a day  Continue  brovana twice a day  Use the albuterol/ipratropium 3 times a day.  (You can use this every 4-6 hours as needed though for increased shortness of breath or wheeze )  Continue Singulair 10mg at night    Chest xray     Follow up in about 6 months (around 11/15/2024).

## 2024-05-15 NOTE — TELEPHONE ENCOUNTER
Left message informing patient her chest xray is ok.  Any questions please call us at [phone].

## 2024-05-21 ENCOUNTER — NUTRITION (OUTPATIENT)
Dept: NUTRITION | Facility: HOSPITAL | Age: 49
End: 2024-05-21
Payer: COMMERCIAL

## 2024-05-21 VITALS — BODY MASS INDEX: 33.27 KG/M2 | HEIGHT: 62 IN | WEIGHT: 180.81 LBS

## 2024-05-21 DIAGNOSIS — Z79.4 TYPE 2 DIABETES MELLITUS WITH HYPERGLYCEMIA, WITH LONG-TERM CURRENT USE OF INSULIN: ICD-10-CM

## 2024-05-21 DIAGNOSIS — E11.65 TYPE 2 DIABETES MELLITUS WITH HYPERGLYCEMIA, WITH LONG-TERM CURRENT USE OF INSULIN: ICD-10-CM

## 2024-05-21 PROCEDURE — G0108 DIAB MANAGE TRN  PER INDIV: HCPCS

## 2024-05-21 NOTE — PROGRESS NOTES
"Diagnosis/Reason for Referral: Diabetes    Medical Nutrition Prescription: Diabetes Self Management Training        Referring professional: Dr. Gee    Anthropometrics  Ht Readings from Last 1 Encounters:   05/21/24 5' 2" (1.575 m)     Wt Readings from Last 1 Encounters:   05/21/24 82 kg (180 lb 12.8 oz)      BMI Readings from Last 1 Encounters:   05/21/24 33.07 kg/m²      Weight History:  Wt Readings from Last 5 Encounters:   05/21/24 82 kg (180 lb 12.8 oz)   05/15/24 82.7 kg (182 lb 6.4 oz)   05/08/24 83.9 kg (185 lb)   04/15/24 81.6 kg (180 lb)   01/11/24 81.6 kg (180 lb)      Caloric needs: 6180-9451 (15-20kcal/kg)  Protein needs: 50-75gm (1-1.5gm/kg)    Potential food/medication interaction: none    Support system: family    Lifestyle/cultural/family influence: takes >20 medications daily and feels overwhelmed sometimes     NFPE: N/A    Nutrition Related Social Determinants of Health: SDOH: None Identified    Diabetes Care Management Summary   Diabetes Education Record Assessment/Progress Initial   Current Diabetes Risk Level High   Diabetes Type   Diabetes Type  Type II   Diabetes History   Diabetes Diagnosis >10 years   Current Treatment Diet;Injectable;Insulin   Nutrition   Meal Planning drinks regular soda;water;eats out seldom;skipping meals   What type of sweetener do you use? Sweet N Low   What type of beverages do you drink? water;regular soda/tea;milk   Meal Plan 24 Hour Recall - Breakfast coffee with sweet and low   Meal Plan 24 Hour Recall - Lunch none   Meal Plan 24 Hour Recall - Dinner red beans and rice (small bowl)   Meal Plan 24 Hour Recall - Snack banana   Monitoring    Self Monitoring  yes   Blood Glucose Logs No   Do you use a personal continuous glucose monitor? Yes   What kind of glucose monitor do you use? Dexcom   In the last month, how often have you had a low blood sugar reaction? once   What are your symptoms of low blood sugar? weakness, "feels funny"   How do you treat low blood " sugar? not sure   Can you tell when your blood sugar is too high? yes   Exercise    Exercise Type none   Social History   Primary Support Self;Family   Barriers to Change   Barriers to Change Psychiatric disorder   Learning Challenges  None   Readiness to Learn    Readiness to Learn  Eager   Diabetes Education Assessment/Progress   Nutrition (Incorporating nutritional management into one's lifestyle) Instructed/patient voiced/demonstrated understanding/Written Materials provided   Physical Activity (incorporating physical activity into one's lifestyle) Instructed/patient voiced/demonstrated understanding/Written Materials provided   Medications (states correct name, dose, onset, peak, duration, side effects & timing of meds) Discussed   Monitoring (monitoring blood glucose/other parameters & using results) Instructed/patient voiced/demonstrated understanding/Written Materials provided   Acute Complications (preventing, detecting, and treating acute complications) Instructed/patient voiced/demonstrated understanding/Written Materials provided   Goals   Patient has selected/evaluated goals during today's session Yes, selected   Healthy Eating Set   Start Date 05/21/24   Problem Solving Set   Start Date 05/21/24   Diabetes Care Plan/Intervention   Education Plan/Intervention In F/U DSMT   Diabetes Meal Plan   Carbohydrate Per Meal 30-45g   Carbohydrate Per Snack  15-20g   Education Units of Time    Time Spent 60 min       Handouts Provided:   Definition and Diagnosis    Nutrition and Meal Planning - Reviewed  Support Plan/Coping Skills - Reviewed  Food label reading - Reviewed  Carbohydrate counting- Reviewed  Low carb snacks - Reviewed  Blood sugar monitoring - Reviewed  Hypoglycemia Management - Reviewed  Sick day guidelines  Foot care  Sharps disposal     Comments:    Patient reports that she wants to know learn what foods she can and cannot eat, learn how to cut back on portion of foods. She noted that her A1C is  high d/t stopped taking medications because she was depressed and tired of taking >20 medications daily. Patient has Dexcom but does not look at results, the [facility] team helps her with that. Patient is more motivated and wants to do well, taking her 65 units of Lantus at morning/evening and Ozempic shot weekly x 2 weeks now.     RD reviewed the above with patient, discussed importance of not skipping meals, increasing intake of veggies for fiber and how to cut back on carbs if needed. We practiced reading the label and reviewed how to treat hypoglycemia since she has had episodes over the last 2 weeks. We discussed eating 30-45 gm of carbohydrates per meal and 15-20gm carbohydrates per snack.     Education materials and contact information provided for questions. RD will follow up in 1 month.     Nutrition Diagnosis PES Statement: Food- and nutrition-rela valentina knowledge deficit  related to use for new education as evidenced by last A1C of 10.9% and patient does not remember what we reviewed last time.    Motivation: high    Goals:    1. Eat 30-45gm per meal and 15-20gm per snack of carbohydrates  2. Follow plate method for meal planning  3. Read food labels    Labs  Clinical Chemistry:  CMP  Sodium   Date Value Ref Range Status   04/15/2024 132 (L) 136 - 145 mmol/L Final   02/25/2019 140 134 - 144 mmol/L      Potassium   Date Value Ref Range Status   04/15/2024 4.2 3.5 - 5.1 mmol/L Final     Chloride   Date Value Ref Range Status   04/15/2024 98 95 - 110 mmol/L Final   02/25/2019 103 98 - 110 mmol/L      CO2   Date Value Ref Range Status   04/15/2024 25 23 - 29 mmol/L Final     Glucose   Date Value Ref Range Status   04/15/2024 395 (H) 70 - 110 mg/dL Final   02/25/2019 143 (H) 70 - 99 mg/dL      BUN   Date Value Ref Range Status   04/15/2024 10 6 - 20 mg/dL Final     Creatinine   Date Value Ref Range Status   04/15/2024 0.6 0.5 - 1.4 mg/dL Final   02/25/2019 0.46 (L) 0.60 - 1.40 mg/dL      Calcium   Date  Value Ref Range Status   04/15/2024 9.2 8.7 - 10.5 mg/dL Final     Total Protein   Date Value Ref Range Status   04/15/2024 6.9 6.0 - 8.4 g/dL Final     Albumin   Date Value Ref Range Status   04/15/2024 3.9 3.5 - 5.2 g/dL Final   02/25/2019 3.8 3.1 - 4.7 g/dL      Total Bilirubin   Date Value Ref Range Status   04/15/2024 0.5 0.1 - 1.0 mg/dL Final     Comment:     For infants and newborns, interpretation of results should be based  on gestational age, weight and in agreement with clinical  observations.    Premature Infant recommended reference ranges:  Up to 24 hours.............<8.0 mg/dL  Up to 48 hours............<12.0 mg/dL  3-5 days..................<15.0 mg/dL  6-29 days.................<15.0 mg/dL       Alkaline Phosphatase   Date Value Ref Range Status   04/15/2024 101 55 - 135 U/L Final     AST   Date Value Ref Range Status   04/15/2024 66 (H) 10 - 40 U/L Final     ALT   Date Value Ref Range Status   04/15/2024 59 (H) 10 - 44 U/L Final     Anion Gap   Date Value Ref Range Status   04/15/2024 9 8 - 16 mmol/L Final     eGFR if    Date Value Ref Range Status   06/27/2022 >60.0 >60 mL/min/1.73 m^2 Final     eGFR if non    Date Value Ref Range Status   06/27/2022 >60.0 >60 mL/min/1.73 m^2 Final     Comment:     Calculation used to obtain the estimated glomerular filtration  rate (eGFR) is the CKD-EPI equation.         CBC:   Lab Results   Component Value Date    WBC 9.41 04/15/2024    HGB 14.8 04/15/2024    HCT 44.9 04/15/2024    MCV 86 04/15/2024     04/15/2024         Lipid Panel:  Lab Results   Component Value Date    CHOL 167 04/15/2024    CHOL 141 05/09/2023    CHOL 204 (H) 12/22/2022     Lab Results   Component Value Date    HDL 39 (L) 04/15/2024    HDL 38 (L) 05/09/2023    HDL 40 12/22/2022     Lab Results   Component Value Date    LDLCALC 101.4 04/15/2024    LDLCALC 84.2 05/09/2023    LDLCALC 138.4 12/22/2022     Lab Results   Component Value Date    TRIG 133  04/15/2024    TRIG 94 05/09/2023    TRIG 128 12/22/2022     Lab Results   Component Value Date    CHOLHDL 23.4 04/15/2024    CHOLHDL 27.0 05/09/2023    CHOLHDL 19.6 (L) 12/22/2022       Diabetes:  Lab Results   Component Value Date    HGBA1C 10.9 (H) 04/15/2024       Thank you for the referral.      Sarah Angeles, MS, RD/LDN, [facility]

## 2024-06-21 ENCOUNTER — NUTRITION (OUTPATIENT)
Dept: NUTRITION | Facility: HOSPITAL | Age: 49
End: 2024-06-21
Payer: MEDICAID

## 2024-06-21 DIAGNOSIS — Z79.4 TYPE 2 DIABETES MELLITUS WITH HYPERGLYCEMIA, WITH LONG-TERM CURRENT USE OF INSULIN: Primary | ICD-10-CM

## 2024-06-21 DIAGNOSIS — E11.65 TYPE 2 DIABETES MELLITUS WITH HYPERGLYCEMIA, WITH LONG-TERM CURRENT USE OF INSULIN: Primary | ICD-10-CM

## 2024-06-21 PROCEDURE — G0108 DIAB MANAGE TRN  PER INDIV: HCPCS

## 2024-06-21 NOTE — PROGRESS NOTES
"Diagnosis/Reason for Referral: Diabetes    Medical Nutrition Prescription: Diabetes Self Management Training        Referring professional: Dr. Gee    Anthropometrics  Ht Readings from Last 1 Encounters:   05/21/24 5' 2" (1.575 m)     Wt Readings from Last 1 Encounters:   05/21/24 82 kg (180 lb 12.8 oz)      BMI Readings from Last 1 Encounters:   05/21/24 33.07 kg/m²        Weight History:  Wt Readings from Last 5 Encounters:   05/21/24 82 kg (180 lb 12.8 oz)   05/15/24 82.7 kg (182 lb 6.4 oz)   05/08/24 83.9 kg (185 lb)   04/15/24 81.6 kg (180 lb)   01/11/24 81.6 kg (180 lb)      Caloric needs: 3989-0531 (15-20kcal/kg)  Protein needs: 50-75gm (1-1.5gm/kg)     Potential food/medication interaction: none     Support system: family     Lifestyle/cultural/family influence: takes >20 medications daily and feels overwhelmed sometimes      NFPE: N/A     Nutrition Related Social Determinants of Health: SDOH: None Identified    Diabetes Care Management Summary   Diabetes Education Record Assessment/Progress Post Program/Follow-up   Current Diabetes Risk Level High   Diabetes Type   Diabetes Type  Type II   Diabetes History   Current Treatment Diet;Injectable;Insulin   Nutrition   Meal Planning 3 meals per day;drinks regular soda;water;eats out seldom   What type of sweetener do you use? Sweet N Low   What type of beverages do you drink? water;regular soda/tea;milk   Meal Plan 24 Hour Recall - Breakfast coffee with sweet and low   Meal Plan 24 Hour Recall - Lunch potpie   Meal Plan 24 Hour Recall - Dinner potpie   Meal Plan 24 Hour Recall - Snack none   Monitoring    Self Monitoring  yes   Blood Glucose Logs No   Do you use a personal continuous glucose monitor? Yes   What kind of glucose monitor do you use? Dexcom   In the last month, how often have you had a low blood sugar reaction? more than once a week   What are your symptoms of low blood sugar? weakness, lightheaded   How do you treat low blood sugar? 4oz juice "   Can you tell when your blood sugar is too high? yes   How do you treat high blood sugar? Dexcom beeps   Exercise    Exercise Type   (house work)   Frequency 3-5 Times per week   Duration 1 hour   Barriers to Change   Barriers to Change Psychiatric disorder   Learning Challenges  None   Readiness to Learn    Readiness to Learn  Eager   Diabetes Education Assessment/Progress   Nutrition (Incorporating nutritional management into one's lifestyle) Discussed   Physical Activity (incorporating physical activity into one's lifestyle) Discussed   Medications (states correct name, dose, onset, peak, duration, side effects & timing of meds) Discussed   Monitoring (monitoring blood glucose/other parameters & using results) Discussed   Acute Complications (preventing, detecting, and treating acute complications) Discussed   Goals   Patient has selected/evaluated goals during today's session Yes, evaluated   Healthy Eating % Met   Met Percentage  75%   Problem Solving In Progress   Reducing Risks % Met   Met Percentage  75%   Diabetes Care Plan/Intervention   Education Plan/Intervention In F/U DSMT   Diabetes Meal Plan   Restrictions Restricted Carbohydrate   Carbohydrate Per Meal 30-45g   Carbohydrate Per Snack  15-20g   Education Units of Time    Time Spent 30 min     Instructions Provided:   - Hypoglycemia  - Snack ideas including fruit and smoothie recipe    Comments:    Patient reports that she is being more mindful to her carbohydrate portions, choosing more whole grains over white bread or tortillas. She wants to know if she can eat brown rice or fruit. We discussed being mindful of portion sizes, having fruit in between meals and provided handouts on snacks and fruit smoothie recipes. Patient noted that she does not have much of an appetite, eating 1-2 meals per day. RD encouraged patient to eat every 3 hours to prevent hypoglycemia, small amounts of food if she does not feel hungry to prevent lean muscle loss and to  keep her from becoming malnourished. She is taking Celsius every day since her energy has been low.     Education materials and contact information provided for questions. RD will follow up in 2 months     Nutrition Diagnosis PES Statement: Food- and nutrition-rela valentina knowledge deficit  related to use for new education as evidenced by last A1C of 10.9% and patient does not remember what we reviewed last time.     Motivation: moderate    Goals:    1. Eat every 3-5 hours to prevent low blood sugar and lean muscle mass  2. Test blood sugars 2 hrs after 1 meal    Labs  Clinical Chemistry:  CMP  Sodium   Date Value Ref Range Status   04/15/2024 132 (L) 136 - 145 mmol/L Final   02/25/2019 140 134 - 144 mmol/L      Potassium   Date Value Ref Range Status   04/15/2024 4.2 3.5 - 5.1 mmol/L Final     Chloride   Date Value Ref Range Status   04/15/2024 98 95 - 110 mmol/L Final   02/25/2019 103 98 - 110 mmol/L      CO2   Date Value Ref Range Status   04/15/2024 25 23 - 29 mmol/L Final     Glucose   Date Value Ref Range Status   04/15/2024 395 (H) 70 - 110 mg/dL Final   02/25/2019 143 (H) 70 - 99 mg/dL      BUN   Date Value Ref Range Status   04/15/2024 10 6 - 20 mg/dL Final     Creatinine   Date Value Ref Range Status   04/15/2024 0.6 0.5 - 1.4 mg/dL Final   02/25/2019 0.46 (L) 0.60 - 1.40 mg/dL      Calcium   Date Value Ref Range Status   04/15/2024 9.2 8.7 - 10.5 mg/dL Final     Total Protein   Date Value Ref Range Status   04/15/2024 6.9 6.0 - 8.4 g/dL Final     Albumin   Date Value Ref Range Status   04/15/2024 3.9 3.5 - 5.2 g/dL Final   02/25/2019 3.8 3.1 - 4.7 g/dL      Total Bilirubin   Date Value Ref Range Status   04/15/2024 0.5 0.1 - 1.0 mg/dL Final     Comment:     For infants and newborns, interpretation of results should be based  on gestational age, weight and in agreement with clinical  observations.    Premature Infant recommended reference ranges:  Up to 24 hours.............<8.0 mg/dL  Up to 48  hours............<12.0 mg/dL  3-5 days..................<15.0 mg/dL  6-29 days.................<15.0 mg/dL       Alkaline Phosphatase   Date Value Ref Range Status   04/15/2024 101 55 - 135 U/L Final     AST   Date Value Ref Range Status   04/15/2024 66 (H) 10 - 40 U/L Final     ALT   Date Value Ref Range Status   04/15/2024 59 (H) 10 - 44 U/L Final     Anion Gap   Date Value Ref Range Status   04/15/2024 9 8 - 16 mmol/L Final     eGFR if    Date Value Ref Range Status   06/27/2022 >60.0 >60 mL/min/1.73 m^2 Final     eGFR if non    Date Value Ref Range Status   06/27/2022 >60.0 >60 mL/min/1.73 m^2 Final     Comment:     Calculation used to obtain the estimated glomerular filtration  rate (eGFR) is the CKD-EPI equation.         CBC:   Lab Results   Component Value Date    WBC 9.41 04/15/2024    HGB 14.8 04/15/2024    HCT 44.9 04/15/2024    MCV 86 04/15/2024     04/15/2024         Lipid Panel:  Lab Results   Component Value Date    CHOL 167 04/15/2024    CHOL 141 05/09/2023    CHOL 204 (H) 12/22/2022     Lab Results   Component Value Date    HDL 39 (L) 04/15/2024    HDL 38 (L) 05/09/2023    HDL 40 12/22/2022     Lab Results   Component Value Date    LDLCALC 101.4 04/15/2024    LDLCALC 84.2 05/09/2023    LDLCALC 138.4 12/22/2022     Lab Results   Component Value Date    TRIG 133 04/15/2024    TRIG 94 05/09/2023    TRIG 128 12/22/2022     Lab Results   Component Value Date    CHOLHDL 23.4 04/15/2024    CHOLHDL 27.0 05/09/2023    CHOLHDL 19.6 (L) 12/22/2022       Diabetes:  Lab Results   Component Value Date    HGBA1C 10.9 (H) 04/15/2024       Thank you for the referral.      Sarah Angeles MS, RD/LDN, CDCES

## 2024-07-13 ENCOUNTER — HOSPITAL ENCOUNTER (EMERGENCY)
Facility: HOSPITAL | Age: 49
Discharge: HOME | End: 2024-07-13
Attending: EMERGENCY MEDICINE
Payer: COMMERCIAL

## 2024-07-13 VITALS
TEMPERATURE: 98 F | SYSTOLIC BLOOD PRESSURE: 117 MMHG | OXYGEN SATURATION: 97 % | HEIGHT: 62 IN | WEIGHT: 181 LBS | RESPIRATION RATE: 18 BRPM | DIASTOLIC BLOOD PRESSURE: 65 MMHG | BODY MASS INDEX: 33.31 KG/M2 | HEART RATE: 97 BPM

## 2024-07-13 DIAGNOSIS — L03.019 PARONYCHIA OF FINGER, UNSPECIFIED LATERALITY: Primary | ICD-10-CM

## 2024-07-13 PROCEDURE — 10060 I&D ABSCESS SIMPLE/SINGLE: CPT

## 2024-07-13 PROCEDURE — 99284 EMERGENCY DEPT VISIT MOD MDM: CPT | Mod: 25

## 2024-07-13 RX ORDER — MUPIROCIN 20 MG/G
OINTMENT TOPICAL 2 TIMES DAILY
Qty: 1 EACH | Refills: 0 | Status: SHIPPED | OUTPATIENT
Start: 2024-07-13 | End: 2024-07-23

## 2024-07-13 RX ORDER — CEPHALEXIN 500 MG/1
500 CAPSULE ORAL EVERY 12 HOURS
Qty: 14 CAPSULE | Refills: 0 | Status: SHIPPED | OUTPATIENT
Start: 2024-07-13 | End: 2024-07-20

## 2024-07-13 RX ORDER — LIDOCAINE HYDROCHLORIDE 10 MG/ML
10 INJECTION, SOLUTION EPIDURAL; INFILTRATION; INTRACAUDAL; PERINEURAL
Status: DISCONTINUED | OUTPATIENT
Start: 2024-07-13 | End: 2024-07-13 | Stop reason: HOSPADM

## 2024-07-13 NOTE — ED PROVIDER NOTES
Encounter Date: 7/13/2024       History     Chief Complaint   Patient presents with    Wound Check     Left 3rd finger tip red, states was trying to cut cuticle on nail     49-year-old female with a past medical history of Bell's palsy, depression, diabetes presents emergency department with pain and swelling to the cuticle of the left middle finger.  Patient states symptoms that has been present for the last 3 days and gotten progressively more swollen and worse.  Patient reports that she cut her cuticles prior to this starting.  The patient is right-hand dominant    The history is provided by the patient.     Review of patient's allergies indicates:   Allergen Reactions    Oxycodone-acetaminophen Hives     Past Medical History:   Diagnosis Date    Allergy     Anxiety     Asthma     Back pain     Behavioral problem     Bipolar disorder     Depression     Diabetes mellitus type I 2018    Digestive disorder     Facial paralysis/Mabie palsy 06/11/2019    Headache(784.0)     Hx of psychiatric care     Hypercholesteremia     Left knee pain 2018    pain worse- some swelling- gave out & recently fell- surg scheduled    Migraines     occ    Psychiatric problem     Psychosis     Therapy      Past Surgical History:   Procedure Laterality Date    ARTHROSCOPY OF KNEE Left 7/27/2020    Procedure: ARTHROSCOPY, KNEE;  Surgeon: Hardy Delgado MD;  Location: [facility] OR;  Service: Orthopedics;  Laterality: Left;    CARPAL TUNNEL RELEASE Right 1/11/2023    Procedure: RELEASE, CARPAL TUNNEL;  Surgeon: Hardy Delgado MD;  Location: [facility] OR;  Service: Orthopedics;  Laterality: Right;    CHONDROPLASTY OF KNEE  11/13/2019    Procedure: CHONDROPLASTY, KNEE;  Surgeon: Hardy Delgado MD;  Location: [facility] OR;  Service: Orthopedics;;    HAND SURGERY      KNEE ARTHROPLASTY Left 7/27/2020    Procedure: ARTHROPLASTY, KNEE patellofemoral replacement;  Surgeon: Hardy Delgado MD;  Location: [facility] OR;  Service: Orthopedics;  Laterality: Left;   ARTHOSURFACE-NEGRO.  PROCEDURE IS PATELLA FEMORAL REPLACEMENT, NOT TKA    KNEE ARTHROSCOPY W/ MENISCECTOMY Left 11/13/2019    Procedure: ARTHROSCOPY, KNEE, WITH MENISCECTOMY;  Surgeon: Hardy Delgado MD;  Location: [facility] OR;  Service: Orthopedics;  Laterality: Left;    KNEE ARTHROSCOPY W/ MENISCECTOMY Left 12/1/2021    Procedure: ARTHROSCOPY, KNEE, WITH MENISCECTOMY;  Surgeon: Hardy Delgado MD;  Location: [facility] OR;  Service: Orthopedics;  Laterality: Left;    SHOULDER SURGERY       Family History   Problem Relation Name Age of Onset    Alcohol abuse Mother      Cirrhosis Mother      Early death Mother      Alcohol abuse Father      Alcohol abuse Sister      Anxiety disorder Sister      Alcohol abuse Brother      Alcohol abuse Maternal Uncle      Drug abuse Maternal Uncle      Suicide Maternal Uncle      Diabetes Maternal Grandmother      Breast cancer Paternal Aunt      Breast cancer Maternal Aunt       Social History     Tobacco Use    Smoking status: Former     Current packs/day: 0.00     Average packs/day: 0.3 packs/day for 1 year (0.3 ttl pk-yrs)     Types: Cigars, Cigarettes     Start date: 4/6/2020     Quit date: 4/6/2021     Years since quitting: 3.2    Smokeless tobacco: Never   Substance Use Topics    Alcohol use: Not Currently    Drug use: No     Review of Systems   Constitutional:  Negative for fever.   HENT: Negative.     Respiratory: Negative.     Cardiovascular: Negative.    Gastrointestinal: Negative.    Genitourinary: Negative.    Skin:  Positive for wound.        Swelling, erythema to the left middle finger   Neurological: Negative.    Hematological: Negative.    Psychiatric/Behavioral: Negative.     All other systems reviewed and are negative.      Physical Exam     Initial Vitals [07/13/24 1036]   BP Pulse Resp Temp SpO2   117/65 97 18 97.9 °F (36.6 °C) 97 %      MAP       --         Physical Exam    Nursing note and vitals reviewed.  Constitutional: She appears well-developed and well-nourished.      Skin:   Patient has swelling and erythema noted to left middle finger consistent with paronychia         ED Course   I & D - Incision and Drainage    Date/Time: 7/13/2024 12:27 PM  Location procedure was performed: [facility] EMERGENCY DEPARTMENT    Performed by: Mable Lala FNP  Authorized by: Prema Denney MD  Type: abscess  Body area: upper extremity  Location details: left long finger  Anesthesia: digital block    Anesthesia:  Local Anesthetic: lidocaine 1% without epinephrine  Anesthetic total: 4 mL  Scalpel size: 11  Drainage: pus  Drainage amount: moderate  Wound treatment: wound left open  Patient tolerance: Patient tolerated the procedure well with no immediate complications        Labs Reviewed - No data to display       Imaging Results    None          Medications   LIDOcaine (PF) 10 mg/ml (1%) injection 100 mg (has no administration in time range)     Medical Decision Making  49-year-old female with a past medical history of Bell's palsy, depression, diabetes presents emergency department with pain and swelling to the cuticle of the left middle finger.  Patient states symptoms that has been present for the last 3 days and gotten progressively more swollen and worse.  Patient reports that she cut her cuticles prior to this starting.  The patient is right-hand dominant    The history is provided by the patient.     49-year-old female presents emergency department with tenderness and swelling to the left middle finger symptoms suggestive of paronychia no tenderness to the pad of the left middle finger consistent with felon.  Digital block performed, small incision made near the cuticle with lot of purulent drainage expressed.  Patient will be discharged home with Keflex, and Bactroban ointment given return precautions    Risk  Prescription drug management.                                      Clinical Impression:  Final diagnoses:  [L03.019] Paronychia of finger, unspecified laterality (Primary)           ED Disposition Condition    Discharge Stable          ED Prescriptions       Medication Sig Dispense Start Date End Date Auth. Provider    cephALEXin (KEFLEX) 500 MG capsule Take 1 capsule (500 mg total) by mouth every 12 (twelve) hours. for 7 days 14 capsule 7/13/2024 7/20/2024 Mable Lala FNP    mupirocin (BACTROBAN) 2 % ointment Apply topically 2 (two) times daily. for 10 days 1 each 7/13/2024 7/23/2024 Mable Lala FNP          Follow-up Information       Follow up With Specialties Details Why Contact Info    Jaydon Gee MD Internal Medicine Schedule an appointment as soon as possible for a visit in 2 days  [address] [facility]  [address] [place] [id number]  [phone]               Mable Lala FNP  07/13/24 0064

## 2024-07-26 ENCOUNTER — PATIENT MESSAGE (OUTPATIENT)
Dept: ADMINISTRATIVE | Facility: HOSPITAL | Age: 49
End: 2024-07-26
Payer: MEDICAID

## 2024-08-11 ENCOUNTER — PATIENT MESSAGE (OUTPATIENT)
Dept: FAMILY MEDICINE | Facility: CLINIC | Age: 49
End: 2024-08-11
Payer: MEDICAID

## 2024-08-11 NOTE — PROGRESS NOTES
Subjective:       Patient ID: Nicolepj Coker is a 49 y.o. female.    Chief Complaint: Diabetes, Hyperlipidemia, Constipation, and  mental condition     Miss Nichols is a 49-year-old female comes for short term follow-up     Previous visit we had noted that her diabetes was essentially uncontrolled.  Compliance, rigor of stewardship was somewhat unknown and uncertain in her case.      She was on Trulicity at 4.5 mg and she had requested change to Ozempic since her mother was Ozempic and it had done apparent wonders own.    We made the change to Ozempic at 0.5 mg per week with a early follow-up which was not   Kept.    Previously she had also wanted to go on a insulin pump because of extremely elevated blood sugars and hemoglobin A1c greater than 10.  Given her limited understanding, regular and stewardship I was concerned about probably miss managing her insulin pump with risk or hypoglycemia.      Referral to nutritional special services   Was also made. (Miss Sarah Angeles)       Her chronic medical issues are as below    1.-history of Bell's palsy with significant residual weakness on the right side of the face.  2.-bipolar disorder  3.-type 2 diabetes mellitus  4.-dyslipidemia   5.-chronic constipation on Linzess 72 mcg  6.-polypharmacy and multiple CNS altering medications  7.-chronic disability  8.-social isolation   9.-cognitive impairment      Significant psych history of some degree of cognitive impairment/bipolar disorder has been noted with following medications which can affect the CNS.      1.-Depakote-1000 mg daily  2.-trazodone 150 mg at nighttime  3.-primidone 100 mg 3 times a day   4.-gabapentin-300 mg nightly?  Taking or not  5.-sertraline 50 mg-? ?  6.-aripiprazole 5 mg? ?    Other medications which could affect the CNS:-  7.-tizanidine 4 mg every 8 hours p.r.n.   8.-        Past Medical History:   Diagnosis Date    Allergy     Anxiety     Asthma     Back pain     Behavioral problem     Bipolar  disorder     Depression     Diabetes mellitus type I 2018    Digestive disorder     Facial paralysis/[place] palsy 06/11/2019    Headache(784.0)     Hx of psychiatric care     Hypercholesteremia     Left knee pain 2018    pain worse- some swelling- gave out & recently fell- surg scheduled    Migraines     occ    Psychiatric problem     Psychosis     Therapy      Social History     Socioeconomic History    Marital status:    Occupational History    Occupation: Unemployed     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   Tobacco Use    Smoking status: Former     Current packs/day: 0.00     Average packs/day: 0.3 packs/day for 1 year (0.3 ttl pk-yrs)     Types: Cigars, Cigarettes     Start date: 4/6/2020     Quit date: 4/6/2021     Years since quitting: 3.3    Smokeless tobacco: Never   Substance and Sexual Activity    Alcohol use: Not Currently    Drug use: No    Sexual activity: Yes     Partners: Male     Social Determinants of Health     Financial Resource Strain: Low Risk  (12/13/2023)    Overall Financial Resource Strain (CARDIA)     Difficulty of Paying Living Expenses: Not hard at all   Food Insecurity: No Food Insecurity (12/13/2023)    Hunger Vital Sign     Worried About Running Out of Food in the Last Year: Never true     Ran Out of Food in the Last Year: Never true   Transportation Needs: No Transportation Needs (12/13/2023)    PRAPARE - Transportation     Lack of Transportation (Medical): No     Lack of Transportation (Non-Medical): No   Physical Activity: Inactive (12/13/2023)    Exercise Vital Sign     Days of Exercise per Week: 4 days     Minutes of Exercise per Session: 0 min   Stress: Stress Concern Present (12/13/2023)    [employer] [place] [employer] - Occupational Stress Questionnaire     Feeling of Stress : To some extent   Housing Stability: High Risk (12/13/2023)    Housing Stability Vital Sign     Unable to Pay for Housing in the Last Year: No     Number  of Places Lived in the Last Year: 9     Unstable Housing in the Last Year: No     Past Surgical History:   Procedure Laterality Date    ARTHROSCOPY OF KNEE Left 7/27/2020    Procedure: ARTHROSCOPY, KNEE;  Surgeon: Hardy Delgado MD;  Location: [facility] OR;  Service: Orthopedics;  Laterality: Left;    CARPAL TUNNEL RELEASE Right 1/11/2023    Procedure: RELEASE, CARPAL TUNNEL;  Surgeon: Hardy Delgado MD;  Location: [facility] OR;  Service: Orthopedics;  Laterality: Right;    CHONDROPLASTY OF KNEE  11/13/2019    Procedure: CHONDROPLASTY, KNEE;  Surgeon: Hardy Delgado MD;  Location: [facility];  Service: Orthopedics;;    HAND SURGERY      KNEE ARTHROPLASTY Left 7/27/2020    Procedure: ARTHROPLASTY, KNEE patellofemoral replacement;  Surgeon: Hardy Delgado MD;  Location: [facility];  Service: Orthopedics;  Laterality: Left;  ARTHOSURFACE-NEGRO.  PROCEDURE IS PATELLA FEMORAL REPLACEMENT, NOT TKA    KNEE ARTHROSCOPY W/ MENISCECTOMY Left 11/13/2019    Procedure: ARTHROSCOPY, KNEE, WITH MENISCECTOMY;  Surgeon: Hardy Delgado MD;  Location: [facility];  Service: Orthopedics;  Laterality: Left;    KNEE ARTHROSCOPY W/ MENISCECTOMY Left 12/1/2021    Procedure: ARTHROSCOPY, KNEE, WITH MENISCECTOMY;  Surgeon: Hardy Delgado MD;  Location: [facility];  Service: Orthopedics;  Laterality: Left;    SHOULDER SURGERY       Family History   Problem Relation Name Age of Onset    Alcohol abuse Mother      Cirrhosis Mother      Early death Mother      Alcohol abuse Father      Alcohol abuse Sister      Anxiety disorder Sister      Alcohol abuse Brother      Alcohol abuse Maternal Uncle      Drug abuse Maternal Uncle      Suicide Maternal Uncle      Diabetes Maternal Grandmother      Breast cancer Paternal Aunt      Breast cancer Maternal Aunt         Review of Systems   Constitutional:  Positive for fatigue and unexpected weight change (Lost 4 lb of weight). Negative for activity change and appetite change.   Eyes:  Positive for visual disturbance. Negative for  "discharge and redness.        Does feel some trouble with eyesight.   Respiratory:  Apnea: blurred vision.    Cardiovascular:  Negative for chest pain, palpitations and leg swelling.   Gastrointestinal:  Positive for constipation (Chronic constipation several medications may be causing that.  Currently on Linzess 72 mcg). Negative for abdominal pain, blood in stool, diarrhea, nausea and vomiting.   Endocrine: Positive for polydipsia and polyuria. Negative for polyphagia.        Uncontrolled diabetes and off medications at this point.   Genitourinary:  Negative for difficulty urinating, dysuria, hematuria and menstrual problem.        Menorrhagia and metrorrhagia   Musculoskeletal:  Positive for arthralgias. Negative for joint swelling and neck pain.        Right hand pain.   Neurological:  Positive for dizziness, tremors and syncope. Negative for seizures, speech difficulty, weakness and headaches.        Denies any new onset of slurred speech.  Old right-sided Bell's palsy has been noted   Psychiatric/Behavioral:  Positive for behavioral problems and dysphoric mood. Negative for agitation and confusion. The patient is nervous/anxious.         Longstanding mental and behavioral issues on several psychotropic medications including Abilify, Depakote, hydroxyzine         Objective:      Blood pressure 127/77, pulse (!) (P) 121, height 5' 2" (1.575 m), weight 82.6 kg (182 lb), last menstrual period 06/21/2024. Body mass index is 33.29 kg/m².  Physical Exam  Vitals and nursing note reviewed.   Constitutional:       General: She is not in acute distress.     Appearance: She is well-developed. She is not ill-appearing or diaphoretic.      Comments: BMI 33.29   HENT:      Head: Normocephalic and atraumatic.      Mouth/Throat:      Mouth: Mucous membranes are moist.   Eyes:      Pupils:      Right eye: Pupil is round and reactive.      Left eye: Pupil is round and reactive.      Comments: Horizontal nystagmus bilaterally "   Neck:      Trachea: Trachea normal.   Cardiovascular:      Rate and Rhythm: Normal rate and regular rhythm.      Heart sounds: Normal heart sounds, S1 normal and S2 normal.   Pulmonary:      Effort: Pulmonary effort is normal. No respiratory distress.      Breath sounds: Normal breath sounds.   Abdominal:      Palpations: Abdomen is soft. Abdomen is not rigid.      Tenderness: There is no abdominal tenderness. There is no guarding.   Musculoskeletal:      Cervical back: Neck supple. No muscular tenderness.   Lymphadenopathy:      Cervical: No cervical adenopathy.   Skin:     General: Skin is warm and dry.      Coloration: Skin is not jaundiced or pale.      Findings: No lesion or rash.   Neurological:      Mental Status: She is alert. Mental status is at baseline.      Cranial Nerves: Facial asymmetry (chronic) present.      Comments: Left-sided Bell's palsy.  (previously erroneously written as right-sided. ).   Psychiatric:         Mood and Affect: Affect is flat and inappropriate.         Speech: Speech is delayed.         Behavior: Behavior is slowed. Behavior is cooperative.         Cognition and Memory: Cognition is impaired.      Comments: Incongruent and rather flat affect.  Limited insight.           Assessment:       No visits with results within 3 Month(s) from this visit.   Latest known visit with results is:   Lab Visit on 04/15/2024   Component Date Value Ref Range Status    Hemoglobin A1C 04/15/2024 10.9 (H)  4.5 - 6.2 % Final    Estimated Avg Glucose 04/15/2024 266 (H)  68 - 131 mg/dL Final    Sodium 04/15/2024 132 (L)  136 - 145 mmol/L Final    Potassium 04/15/2024 4.2  3.5 - 5.1 mmol/L Final    Chloride 04/15/2024 98  95 - 110 mmol/L Final    CO2 04/15/2024 25  23 - 29 mmol/L Final    Glucose 04/15/2024 395 (H)  70 - 110 mg/dL Final    BUN 04/15/2024 10  6 - 20 mg/dL Final    Creatinine 04/15/2024 0.6  0.5 - 1.4 mg/dL Final    Calcium 04/15/2024 9.2  8.7 - 10.5 mg/dL Final    Total Protein  04/15/2024 6.9  6.0 - 8.4 g/dL Final    Albumin 04/15/2024 3.9  3.5 - 5.2 g/dL Final    Total Bilirubin 04/15/2024 0.5  0.1 - 1.0 mg/dL Final    Alkaline Phosphatase 04/15/2024 101  55 - 135 U/L Final    AST 04/15/2024 66 (H)  10 - 40 U/L Final    ALT 04/15/2024 59 (H)  10 - 44 U/L Final    eGFR 04/15/2024 >60.0  >60 mL/min/1.73 m^2 Final    Anion Gap 04/15/2024 9  8 - 16 mmol/L Final    Cholesterol 04/15/2024 167  120 - 199 mg/dL Final    Triglycerides 04/15/2024 133  30 - 150 mg/dL Final    HDL 04/15/2024 39 (L)  40 - 75 mg/dL Final    LDL Cholesterol 04/15/2024 101.4  63.0 - 159.0 mg/dL Final    HDL/Cholesterol Ratio 04/15/2024 23.4  20.0 - 50.0 % Final    Total Cholesterol/HDL Ratio 04/15/2024 4.3  2.0 - 5.0 Final    Non-HDL Cholesterol 04/15/2024 128  mg/dL Final    Microalbumin, Urine 04/15/2024 20.2 (H)  <19.9 ug/mL Final    Creatinine, Urine 04/15/2024 51.6  15.0 - 325.0 mg/dL Final    Microalb/Creat Ratio 04/15/2024 39.1 (H)  0.0 - 30.0 ug/mg Final    Vit D, 25-Hydroxy 04/15/2024 37  30 - 96 ng/mL Final    WBC 04/15/2024 9.41  3.90 - 12.70 K/uL Final    RBC 04/15/2024 5.20  4.00 - 5.40 M/uL Final    Hemoglobin 04/15/2024 14.8  12.0 - 16.0 g/dL Final    Hematocrit 04/15/2024 44.9  37.0 - 48.5 % Final    MCV 04/15/2024 86  82 - 98 fL Final    MCH 04/15/2024 28.5  27.0 - 31.0 pg Final    MCHC 04/15/2024 33.0  32.0 - 36.0 g/dL Final    RDW 04/15/2024 13.5  11.5 - 14.5 % Final    Platelets 04/15/2024 279  150 - 450 K/uL Final    MPV 04/15/2024 9.8  9.2 - 12.9 fL Final    Immature Granulocytes 04/15/2024 0.3  0.0 - 0.5 % Final    Gran # (ANC) 04/15/2024 5.2  1.8 - 7.7 K/uL Final    Immature Grans (Abs) 04/15/2024 0.03  0.00 - 0.04 K/uL Final    Lymph # 04/15/2024 3.5  1.0 - 4.8 K/uL Final    Mono # 04/15/2024 0.6  0.3 - 1.0 K/uL Final    Eos # 04/15/2024 0.1  0.0 - 0.5 K/uL Final    Baso # 04/15/2024 0.04  0.00 - 0.20 K/uL Final    nRBC 04/15/2024 0  0 /100 WBC Final    Gran % 04/15/2024 55.1  38.0 - 73.0 % Final     Lymph % 04/15/2024 37.4  18.0 - 48.0 % Final    Mono % 04/15/2024 5.8  4.0 - 15.0 % Final    Eosinophil % 04/15/2024 1.0  0.0 - 8.0 % Final    Basophil % 04/15/2024 0.4  0.0 - 1.9 % Final    Differential Method 04/15/2024 Automated   Final       1. Type 2 diabetes mellitus with hyperglycemia, with long-term current use of insulin  -     Hemoglobin A1C; Future; Expected date: 08/13/2024  -     Comprehensive Metabolic Panel; Future; Expected date: 08/13/2024  -     Ambulatory referral/consult to Ophthalmology; Future; Expected date: 09/12/2024    2. Other migraine with status migrainosus, not intractable    3. Episodic tension-type headache, not intractable    4. At increased risk for social isolation    5. Cognitive deficits    6. Menorrhagia with irregular cycle    7. Chronic idiopathic constipation  Comments:  Reports constipation.  Trial of Linzess.  72 mcg.  Multiple factors could contribute to this including lack of roughage, lack of exercise, multiple medications  Orders:  -     linaCLOtide (LINZESS) 72 mcg Cap capsule; Take 1 capsule (72 mcg total) by mouth before breakfast.  Dispense: 30 capsule; Refill: 5    8. Screening mammogram for breast cancer  -     Mammo Digital Screening Bilat w/ Ad; Future; Expected date: 11/12/2024    9. Blurred vision  -     Ambulatory referral/consult to Ophthalmology; Future; Expected date: 09/12/2024           Component Ref Range & Units 3 mo ago  (4/15/24) 1 yr ago  (5/9/23) 1 yr ago  (3/22/23) 1 yr ago  (12/22/22) 1 yr ago  (9/20/22) 2 yr ago  (6/27/22) 2 yr ago  (1/18/22)   Hemoglobin A1C 4.5 - 6.2 % 10.9 High  6.7 High  CM 6.6 High  CM 7.5 High  CM 7.8 High  CM 8.3 High  CM 7.3 High      Plan:   Type 2 diabetes mellitus with hyperglycemia, with long-term current use of insulin  -     Hemoglobin A1C; Future; Expected date: 08/13/2024  -     Comprehensive Metabolic Panel; Future; Expected date: 08/13/2024  -     Ambulatory referral/consult to Ophthalmology; Future;  Expected date: 09/12/2024    Other migraine with status migrainosus, not intractable    Episodic tension-type headache, not intractable    At increased risk for social isolation    Cognitive deficits    Menorrhagia with irregular cycle    Chronic idiopathic constipation  Comments:  Reports constipation.  Trial of Linzess.  72 mcg.  Multiple factors could contribute to this including lack of roughage, lack of exercise, multiple medications  Orders:  -     linaCLOtide (LINZESS) 72 mcg Cap capsule; Take 1 capsule (72 mcg total) by mouth before breakfast.  Dispense: 30 capsule; Refill: 5    Screening mammogram for breast cancer  -     Mammo Digital Screening Bilat w/ Ad; Future; Expected date: 11/12/2024    Blurred vision  -     Ambulatory referral/consult to Ophthalmology; Future; Expected date: 09/12/2024     She was somewhat tachycardic today.  I have advised her to check her pulse rate and get back to us.    Headaches are better.    She is pending an eye examination and will make a referral accordingly.      Constipation is stable with Linzess.       Continue with COVID precautions and take flu vaccination.  Please check with a gynecologist for Pap smear.  Advised Ms. Coker to monitor Blood sugars at home and record them.  Exercise, watch diet and loose weight.  keep a close eye on feet and keep them clean. Annual eye examination. Annual influenza vaccine.  Monitor HgbA1c every 3 to 6 months. Monitor urine microalbumin every year.keep LDL less than 100. Monitor blood pressure and target blood pressure 120/70.        Follow up in about 4 months (around 12/12/2024) for Diabetes/HTN/Lipids.      Current Outpatient Medications:     albuterol-ipratropium (DUO-NEB) 2.5 mg-0.5 mg/3 mL nebulizer solution, Take 3 mLs by nebulization every 6 (six) hours as needed for Wheezing. Rescue, Disp: 120 each, Rfl: 4    arformoteroL (BROVANA) 15 mcg/2 mL Nebu, Take 2 mLs (15 mcg total) by nebulization every 12 (twelve) hours.  Controller, Disp: 120 mL, Rfl: 6    ARIPiprazole (ABILIFY) 5 MG Tab, Take 5 mg by mouth once daily., Disp: , Rfl:     atorvastatin (LIPITOR) 40 MG tablet, TAKE 1 TABLET BY MOUTH ONCE DAILY IN THE EVENING, Disp: 90 tablet, Rfl: 1    azelastine (ASTELIN) 137 mcg (0.1 %) nasal spray, 1 spray (137 mcg total) by Nasal route 2 (two) times daily., Disp: 30 mL, Rfl: 5    blood sugar diagnostic Strp, 1 strip by Misc.(Non-Drug; Combo Route) route 3 (three) times daily. Contour next EZ test strips, Disp: 50 strip, Rfl: 6    blood-glucose sensor (DEXCOM G7 SENSOR) Tanvi, 1 Device by Misc.(Non-Drug; Combo Route) route every 10 days., Disp: 3 each, Rfl: 5    cetirizine (ZYRTEC) 10 MG tablet, Take 1 tablet by mouth once daily, Disp: 30 tablet, Rfl: 2    CHILDREN'S ASPIRIN 81 mg Chew, Take 81 mg by mouth., Disp: , Rfl:     diclofenac (VOLTAREN) 75 MG EC tablet, Take 1 tablet (75 mg total) by mouth 2 (two) times daily as needed (back pain)., Disp: 30 tablet, Rfl: 2    divalproex ER (DEPAKOTE) 500 MG Tb24, Take 1,000 mg by mouth once daily. , Disp: , Rfl: 2    ergocalciferol (ERGOCALCIFEROL) 50,000 unit Cap, Take 1 capsule (50,000 Units total) by mouth every 7 days., Disp: 4 capsule, Rfl: 5    fluticasone propionate (FLONASE) 50 mcg/actuation nasal spray, Use 1 spray(s) in each nostril twice daily, Disp: 16 g, Rfl: 5    gabapentin (NEURONTIN) 300 MG capsule, Take 1 capsule (300 mg total) by mouth every evening., Disp: 30 capsule, Rfl: 2    hydrOXYzine pamoate (VISTARIL) 25 MG Cap, Take 1 capsule by mouth nightly as needed., Disp: , Rfl: 1    insulin (LANTUS SOLOSTAR U-100 INSULIN) glargine 100 units/mL SubQ pen, INJECT 65 UNITS SUBCUTANEOUSLY IN THE MORNING  AND 65 UNITS SUBCUTANEOUSLY IN THE EVENING ,MAY  TITRATE  UP  2  UNITS  PER  WEEK  TO  REACH  FASTING  GLUCOSE  OF  110. Max dose 85 units daily, Disp: 30 mL, Rfl: 2    lancets 32 gauge Misc, 1 lancet by Misc.(Non-Drug; Combo Route) route 3 (three) times daily as needed., Disp:  "100 each, Rfl: 5    montelukast (SINGULAIR) 10 mg tablet, Take 1 tablet (10 mg total) by mouth every evening., Disp: 30 tablet, Rfl: 6    nebulizer and compressor (COMP-AIR NEBULIZER COMPRESSOR) Tanvi, To be used to nebulize medication, Disp: 1 each, Rfl: 0    norgestimate-ethinyl estradioL (ORTHO-CYCLEN) 0.25-35 mg-mcg per tablet, Take 1 tablet by mouth once daily., Disp: 28 tablet, Rfl: 11    ondansetron (ZOFRAN-ODT) 4 MG TbDL, Take 1 tablet (4 mg total) by mouth every 6 (six) hours as needed (nausea)., Disp: 30 tablet, Rfl: 1    pen needle, diabetic (BD ULTRA-FINE SHORT PEN NEEDLE) 31 gauge x 5/16" Ndle, Inject 1 Needle into the skin 2 (two) times a day., Disp: 100 each, Rfl: 5    primidone (MYSOLINE) 50 MG Tab, Take 100 mg by mouth 3 (three) times daily., Disp: , Rfl:     semaglutide (OZEMPIC) 0.25 mg or 0.5 mg (2 mg/3 mL) pen injector, Inject 0.5 mg into the skin every 7 days., Disp: 3 mL, Rfl: 5    sertraline (ZOLOFT) 50 MG tablet, Take 50 mg by mouth., Disp: , Rfl:     sumatriptan (IMITREX) 100 MG tablet, Take 1 tablet (100 mg total) by mouth as needed (Take at onset of the headache and repeat 2 hours later if needed.  Max 2 tabs in 24 hours.)., Disp: 15 tablet, Rfl: 5    TENS unit and electrodes Cmpk, 1 each by Misc.(Non-Drug; Combo Route) route once daily., Disp: 1 each, Rfl: 0    tiZANidine (ZANAFLEX) 4 MG tablet, Take 1 tablet (4 mg total) by mouth every 8 (eight) hours., Disp: 90 tablet, Rfl: 5    traZODone (DESYREL) 100 MG tablet, Take 150 mg by mouth nightly as needed., Disp: , Rfl:     blood-glucose meter kit, Use as instructed, Disp: 1 each, Rfl: 0    linaCLOtide (LINZESS) 72 mcg Cap capsule, Take 1 capsule (72 mcg total) by mouth before breakfast., Disp: 30 capsule, Rfl: 5    Jaydon Gee      Message sent to patient on August 11th to make a complete list of medications.     Anushka miss Rivera    Please give me a complete list of your medications especially the psych medications.  Also the dosage of " "insulin and if you have checked your blood sugars     Dr. Marlen ERICKSON      Further education and research on patient's conditions:-    Managing a patient with a complex medication regimen, particularly one with a psychiatric history and cognitive issues, requires careful consideration of potential drug interactions and central nervous system (CNS) effects.  One  has to follow astructured approach to co-managing this 49-year-old female patient:  Step 1: Comprehensive Medication Review  Conduct a thorough review of all medications, including dosages, frequencies, and indications. This includes clarifying the uncertainties noted (e.g., "Taking or not" and "?" next to some medications).  Current Medications and Potential CNS Effects  Depakote (Valproate) - 1000 mg daily  Indications: Bipolar disorder, seizure control  CNS Effects: Sedation, dizziness, cognitive impairment, tremor, confusion  Trazodone - 150 mg at night  Indications: Depression, insomnia  CNS Effects: Sedation, dizziness, cognitive impairment, orthostatic hypotension  Primidone - 100 mg three times a day  Indications: Seizures, essential tremor  CNS Effects: Sedation, cognitive impairment, dizziness, ataxia  Gabapentin - 300 mg nightly  Indications: Neuropathic pain, seizures  CNS Effects: Sedation, dizziness, cognitive impairment, ataxia  Sertraline - 50 mg daily  Indications: Depression, anxiety  CNS Effects: Insomnia, dizziness, fatigue, cognitive impairment (rarely)  Aripiprazole - 5 mg daily  Indications: Schizophrenia, bipolar disorder, adjunct in major depressive disorder  CNS Effects: Sedation, dizziness, cognitive impairment, akathisia  Step 2: Assess for Overlapping CNS Effects  Given the potential for significant CNS effects from these medications, evaluate the patient for:  Sedation or excessive drowsiness  Cognitive impairment or confusion  Dizziness or balance issues  Mood changes or psychiatric symptoms  Step 3: Clinical Assessment  History " and Physical Examination: Focus on neurological and psychiatric symptoms, cognitive function, and overall well-being.  Cognitive Assessment: Use tools like the Mini-Mental State Examination (MMSE) or Grupo Cognitive Assessment (MoCA) to evaluate cognitive function.  Lab Tests: Monitor liver function (for Depakote), renal function (for gabapentin), and blood levels of Depakote and primidone if indicated.  Step 4: Collaboration with Specialists  Psychiatrist: Coordinate with the patient's psychiatrist to ensure psychiatric medications are optimized and necessary. Discuss potential dose adjustments or alternative therapies.  Neurologist: If the patient has a seizure disorder or tremor, involve a neurologist to assess the need for primidone and gabapentin, and to monitor for side effects.  Step 5: Medication Adjustment  Depakote: Monitor serum levels and adjust dosage if cognitive impairment or sedation is significant.  Trazodone: Consider reducing the dose if sedation is problematic. Alternative sleep aids may be explored.  Primidone: Assess necessity and consider tapering if cognitive effects are severe, under neurologist guidance.  Gabapentin: Evaluate the need and consider dose reduction or discontinuation if contributing to cognitive impairment.  Sertraline: Generally well-tolerated but monitor for any cognitive side effects. Adjust dose if necessary.  Aripiprazole: Monitor for akathisia and cognitive effects. Dose adjustments or alternative antipsychotics may be considered.  Step 6: Monitoring and Follow-Up  Regular Follow-Up: Schedule regular follow-up appointments to monitor cognitive function, medication side effects, and overall health status.  Patient and Family Education: Educate the patient and family about potential side effects and the importance of adherence to follow-up appointments.  Step 7: Documentation and Communication  Ensure thorough documentation of all assessments, discussions with  specialists, and changes in medication.  Communicate clearly with the patients care team to ensure a coordinated approach.  Summary  Managing this patient involves a careful balance of her psychiatric and neurological needs while minimizing CNS side effects. Regular monitoring, collaboration with specialists, and potential medication adjustments are essential to optimize her care.

## 2024-08-12 ENCOUNTER — PATIENT MESSAGE (OUTPATIENT)
Dept: FAMILY MEDICINE | Facility: CLINIC | Age: 49
End: 2024-08-12

## 2024-08-12 ENCOUNTER — OFFICE VISIT (OUTPATIENT)
Dept: FAMILY MEDICINE | Facility: CLINIC | Age: 49
End: 2024-08-12
Payer: MEDICAID

## 2024-08-12 VITALS
HEIGHT: 62 IN | BODY MASS INDEX: 33.49 KG/M2 | WEIGHT: 182 LBS | DIASTOLIC BLOOD PRESSURE: 77 MMHG | SYSTOLIC BLOOD PRESSURE: 127 MMHG

## 2024-08-12 DIAGNOSIS — H53.8 BLURRED VISION: ICD-10-CM

## 2024-08-12 DIAGNOSIS — E11.65 TYPE 2 DIABETES MELLITUS WITH HYPERGLYCEMIA, WITH LONG-TERM CURRENT USE OF INSULIN: Primary | ICD-10-CM

## 2024-08-12 DIAGNOSIS — K59.04 CHRONIC IDIOPATHIC CONSTIPATION: Chronic | ICD-10-CM

## 2024-08-12 DIAGNOSIS — Z79.4 TYPE 2 DIABETES MELLITUS WITH HYPERGLYCEMIA, WITH LONG-TERM CURRENT USE OF INSULIN: Primary | ICD-10-CM

## 2024-08-12 DIAGNOSIS — R41.89 COGNITIVE DEFICITS: ICD-10-CM

## 2024-08-12 DIAGNOSIS — G44.219 EPISODIC TENSION-TYPE HEADACHE, NOT INTRACTABLE: ICD-10-CM

## 2024-08-12 DIAGNOSIS — Z91.89 AT INCREASED RISK FOR SOCIAL ISOLATION: ICD-10-CM

## 2024-08-12 DIAGNOSIS — N92.1 MENORRHAGIA WITH IRREGULAR CYCLE: ICD-10-CM

## 2024-08-12 DIAGNOSIS — Z12.31 SCREENING MAMMOGRAM FOR BREAST CANCER: ICD-10-CM

## 2024-08-12 DIAGNOSIS — G43.801 OTHER MIGRAINE WITH STATUS MIGRAINOSUS, NOT INTRACTABLE: ICD-10-CM

## 2024-08-12 PROCEDURE — 3066F NEPHROPATHY DOC TX: CPT | Mod: CPTII,,, | Performed by: INTERNAL MEDICINE

## 2024-08-12 PROCEDURE — 3074F SYST BP LT 130 MM HG: CPT | Mod: CPTII,,, | Performed by: INTERNAL MEDICINE

## 2024-08-12 PROCEDURE — 99213 OFFICE O/P EST LOW 20 MIN: CPT | Mod: S$PBB,,, | Performed by: INTERNAL MEDICINE

## 2024-08-12 PROCEDURE — 3008F BODY MASS INDEX DOCD: CPT | Mod: CPTII,,, | Performed by: INTERNAL MEDICINE

## 2024-08-12 PROCEDURE — 3078F DIAST BP <80 MM HG: CPT | Mod: CPTII,,, | Performed by: INTERNAL MEDICINE

## 2024-08-12 PROCEDURE — 99214 OFFICE O/P EST MOD 30 MIN: CPT | Mod: PBBFAC,PN | Performed by: INTERNAL MEDICINE

## 2024-08-12 PROCEDURE — 3060F POS MICROALBUMINURIA REV: CPT | Mod: CPTII,,, | Performed by: INTERNAL MEDICINE

## 2024-08-12 PROCEDURE — 1160F RVW MEDS BY RX/DR IN RCRD: CPT | Mod: CPTII,,, | Performed by: INTERNAL MEDICINE

## 2024-08-12 PROCEDURE — 99999 PR PBB SHADOW E&M-EST. PATIENT-LVL IV: CPT | Mod: PBBFAC,,, | Performed by: INTERNAL MEDICINE

## 2024-08-12 PROCEDURE — 1159F MED LIST DOCD IN RCRD: CPT | Mod: CPTII,,, | Performed by: INTERNAL MEDICINE

## 2024-08-12 PROCEDURE — 3046F HEMOGLOBIN A1C LEVEL >9.0%: CPT | Mod: CPTII,,, | Performed by: INTERNAL MEDICINE

## 2024-08-16 ENCOUNTER — PATIENT OUTREACH (OUTPATIENT)
Dept: ADMINISTRATIVE | Facility: HOSPITAL | Age: 49
End: 2024-08-16
Payer: MEDICAID

## 2024-08-16 NOTE — LETTER
"-       AUTHORIZATION FOR RELEASE OF   CONFIDENTIAL INFORMATION    Dear Dr. Faith Franco,    We are seeing Nicole Coker, date of birth 1975, in the clinic at [facility] [facility]. Jaydon Gee MD is the patient's PCP. Nicole Coker has an outstanding lab/procedure at the time we reviewed her chart. In order to help keep her health information updated, she has authorized us to request the following medical record(s):        ( x )Eye Exam                                         Please fax records to [facility], Raina, Sanjay, MD, [phone]     If you have any questions, please contact       Romi Barajas  Nurse Clinical Care Coordinator  [facility]  Phone: [phone]  Fax: [phone]      -Patient Name: Nicole Coker  : 1975  Patient Phone #: [phone]             Nicole Coker  MRN: [id number]  : 1975  Age: 49 y.o.  Sex: female          Registration Hospital Authorization         Patient/Guardian Signature:      A. Consent for Examination and Treatment: I hereby authorize the providers and employees of [facility] ("[facility]") to provide medical treatment/services which includes, but is not limited to, performing and administering tests and diagnostic procedures that are deemed necessary, including, but not limited to, imaging examinations, blood tests and other laboratory procedures as may be required by the hospital, clinic, or may be ordered by my physician(s) or persons working under the general and/or special instructions of my physician(s).      1. I understand and agree that this consent covers all authorized persons, including but not limited to physicians, residents, nurse practitioners, physicians' assistants, specialists, consultants, student nurses, and independently contracted physicians, who are called upon by the physician in charge, to carry out the " diagnostic procedures and medical or surgical treatment.   2. I hereby authorize [facility] to retain or dispose of any specimens or tissue, should there be such remaining from any test or procedure.   3. I hereby authorize and give consent for [facility] providers and employees to take photographs, images or videotapes of such diagnostic, surgical or treatment procedures of Patient as may be required by [facility] or as may be ordered by a physician. I further acknowledge and agree that [facility] may use cameras or other devices for patient monitoring.   4. I am aware that the practice of medicine is not an exact science, and I acknowledge that no guarantees have been made to me as to the outcome of any tests, procedures or treatment.   5. As part of your [facility] delivery, you will be offered a Covid-19 vaccine. Certain eligibility criteria may be supported under Emergency Use Authorization (EUA). Please let your medical team know if you wish to receive the Covid-19 vaccine during this hospitalization.      B. Authorization for Release of Information: I understand that my insurance company and/or their agents may need information necessary to make determinations about payment/reimbursement. I hereby provide authorization to release to all insurance companies, their successors, assignees, other parties with whom they may have contracted, or others acting on their behalf, that are involved with payment for any hospital and/or clinic charges incurred by the patient, any information that they request and deem necessary for payment/reimbursement, and/or quality review.   I further authorize the release of my health information to physicians or other health care practitioners on staff who are involved in my health care now and in the future, and to other health care providers, entities, or institutions for the purpose of my continued care and treatment, including referrals.          C. Medicare Patient's Certification and Authorization to Release Information and Payment Request: I certify that the information given by me in applying for payment under Title XVIII of the Social Security Act is correct. I authorize any vasquez of medical or other information about me to release to the Social Security Administration, or its intermediaries or carriers, any information needed for this or a related Medicare claim. I request that payment of authorized benefits be made on my behalf.      Hospital Authorization - Form 1084 - pg. 1 of 3                  D. Assignment of Insurance Benefits: I hereby authorize any and all insurance companies, health plans, defined benefit plans, health insurers or any entity that is or may be responsible for payment of my medical expenses to pay all hospital and medical benefits now due, and to become due and payable to me under any hospital benefits, sick benefits, injury benefits or any other benefit for services rendered to me, including Major Medical Benefits, direct to [facility] and all independently contracted physicians. I assign any and all rights that I may have against any and all insurance companies, health plans, defined benefit plans, health insurers or any entity that is or may be responsible for payment of my medical expenses, including, but not limited to any right to appeal a denial of a claim, any right to bring any action, lawsuit, administrative proceeding, or other cause of action on my behalf. I specifically assign my right to pursue litigation against any and all insurance companies, health plans, defined benefit plans, health insurers or any entity that is or may be responsible for payment of my medical expenses based upon a refusal to pay charges.       E. Valuables: It is understood and agreed that [facility] is not liable for the damage to or loss of any money, jewelry, documents, dentures, eye glasses, hearing aids,  prosthetics, or other property of value.      F. Computer Equipment: I understand and agree that should I choose to use computer equipment owned by [facility] or if I choose to access the Internet via [employer] network, I do so at my own risk. [facility] is not responsible for any damage to my computer equipment or to any damages of any type that might arise from my loss of equipment or data.      G. Acceptance of Financial Responsibility: I agree that in consideration of the services and supplies that have been or will be furnished to the patient, I am hereby obligated to pay all charges made for or on the account of the patient according to the standard rates (in effect at the time the services and supplies are delivered) established by [facility], including its Patient Financial Assistance Policy to the extent it is applicable. I understand that I am responsible for all charges, or portions thereof, not covered by insurance or other sources. Patient refunds will be distributed only after balances at all [facility] facilities are paid.      H. Communication Authorization: I hereby authorize [facility] and its representatives, along with any billing service or  who may work on their behalf, to contact me on my cell phone and/or home phone using pre-recorded messages, artificial voice messages, automatic telephone dialing devices or other computer assisted technology, or by electronic mail, text messaging, or by any other form of electronic communication. This includes, but is not limited to, appointment reminders, yearly physical exam reminders, preventive care reminders, patient campaigns, welcome calls, and calls about account balances on my account or any account on which I am listed as a guarantor. I understand I have the right to opt out of these communications at any time.      I. Relationship Between Facility and Physician: I understand that some, but  not all, providers furnishing services to the patient are not employees or agents of [facility]. The patient is under the care and supervision of his/her attending physician, and it is the responsibility of the facility and its nursing staff to carry out the instructions of such physicians. It is the responsibility of the patient's physician/designee to obtain the patient's informed consent, when required, for medical or surgical treatment, special diagnostic or therapeutic procedures, or hospital services rendered for the patient under the special instructions of the physician/designee.      J. Notice of Privacy Practices: I acknowledge I have received a copy of [facility]'s Notice of Privacy Practices.      K. Facility Directory: I have discussed with the organization my desire to be either included or excluded in the facility directory. I understand that if my choice is to opt-out of being identified in the facility directory that the facility will not provide any information about me such as my condition (e.g., fair, stable, etc.) or my location in the facility (e.g., room number, department).         Hospital Authorization - Form 1084 - pg.2 of 3                          L. Immunizations: [facility] shares immunization information with state sponsored health departments to help you and your doctor keep track of your immunization records. By signing, you consent to have this information shared with the health department in your state:      [place] - LIN[place] ([place] [employer])      MAXIME [facility]: As used in this document, [facility] means all [facility] owned and managed facilities, including, but not limited to, all health centers, surgery centers, clinics, urgent care centers, and hospitals.      N. TERM: This authorization is valid for this and subsequent care/treatment I receive at [facility] and will remain valid unless/ until revoked  in writing by me.         [facility] complies with applicable Federal civil rights laws and does not discriminate on the basis of race, color, national origin, age, disability, sex, gender identity or expression.      [place] [facility] cumple con las leyes federales de derechos civiles aplicables y no discrimina por motivos de north, color, nacionalidad, edad, discapacidad, sexo, identidad o expresión de género. Radha steiner [phone]      [facility] tuân th? lien?t nhân richmond?n hi?n hành c?a Liên bang và không phân bi?t ??i x? d?a trên ch?ng t?c, màu da, lawrence?n g?c qu?c sapna, tu?i tác khuy?t t?t, gi?i tính, nh?n d?ng gi?i t??nh ho?c bi?u hi?n. G?i s? [phone].      Hospital Authorization - Form 1084 - pg. 3 of 3

## 2024-08-16 NOTE — PROGRESS NOTES
Population Health Chart Review & Patient Outreach Details      Additional Pop Health Notes:      No labs or pap smear was found in [facility] or [facility].         Updates Requested / Reviewed:      Updated Care Coordination Note, Care Everywhere, , and Immunizations Reconciliation Completed or Queried: [facility] Topics Overdue:      [facility] Score: 3     Cervical Cancer Screening  Eye Exam  Hemoglobin A1c                       Health Maintenance Topic(s) Outreach Outcomes & Actions Taken:    Eye Exam - Outreach Outcomes & Actions Taken  : External Records Requested & Care Team Updated if Applicable

## 2024-10-01 ENCOUNTER — PATIENT MESSAGE (OUTPATIENT)
Dept: ADMINISTRATIVE | Facility: HOSPITAL | Age: 49
End: 2024-10-01
Payer: MEDICAID

## 2024-10-01 DIAGNOSIS — Z98.890 HISTORY OF CARPAL TUNNEL SURGERY OF RIGHT WRIST: Primary | ICD-10-CM

## 2024-10-01 DIAGNOSIS — Z98.890 S/P CARPAL TUNNEL RELEASE: ICD-10-CM

## 2024-10-03 ENCOUNTER — HOSPITAL ENCOUNTER (OUTPATIENT)
Dept: RADIOLOGY | Facility: HOSPITAL | Age: 49
Discharge: HOME | End: 2024-10-03
Attending: ORTHOPAEDIC SURGERY
Payer: MEDICAID

## 2024-10-03 ENCOUNTER — OFFICE VISIT (OUTPATIENT)
Dept: ORTHOPEDICS | Facility: CLINIC | Age: 49
End: 2024-10-03
Payer: MEDICAID

## 2024-10-03 VITALS — BODY MASS INDEX: 33.51 KG/M2 | WEIGHT: 182.13 LBS | HEIGHT: 62 IN

## 2024-10-03 DIAGNOSIS — Z98.890 HISTORY OF CARPAL TUNNEL SURGERY OF RIGHT WRIST: ICD-10-CM

## 2024-10-03 DIAGNOSIS — M79.641 RIGHT HAND PAIN: Primary | ICD-10-CM

## 2024-10-03 DIAGNOSIS — M79.641 RIGHT HAND PAIN: ICD-10-CM

## 2024-10-03 DIAGNOSIS — Z98.890 S/P CARPAL TUNNEL RELEASE: ICD-10-CM

## 2024-10-03 PROCEDURE — 3066F NEPHROPATHY DOC TX: CPT | Mod: CPTII,,, | Performed by: ORTHOPAEDIC SURGERY

## 2024-10-03 PROCEDURE — 99214 OFFICE O/P EST MOD 30 MIN: CPT | Mod: PBBFAC,25,PO | Performed by: ORTHOPAEDIC SURGERY

## 2024-10-03 PROCEDURE — 99204 OFFICE O/P NEW MOD 45 MIN: CPT | Mod: S$PBB,,, | Performed by: ORTHOPAEDIC SURGERY

## 2024-10-03 PROCEDURE — 3046F HEMOGLOBIN A1C LEVEL >9.0%: CPT | Mod: CPTII,,, | Performed by: ORTHOPAEDIC SURGERY

## 2024-10-03 PROCEDURE — 73110 X-RAY EXAM OF WRIST: CPT | Mod: TC,PO,RT

## 2024-10-03 PROCEDURE — 73130 X-RAY EXAM OF HAND: CPT | Mod: TC,PO,RT

## 2024-10-03 PROCEDURE — 3008F BODY MASS INDEX DOCD: CPT | Mod: CPTII,,, | Performed by: ORTHOPAEDIC SURGERY

## 2024-10-03 PROCEDURE — 1159F MED LIST DOCD IN RCRD: CPT | Mod: CPTII,,, | Performed by: ORTHOPAEDIC SURGERY

## 2024-10-03 PROCEDURE — 3060F POS MICROALBUMINURIA REV: CPT | Mod: CPTII,,, | Performed by: ORTHOPAEDIC SURGERY

## 2024-10-03 PROCEDURE — 99999 PR PBB SHADOW E&M-EST. PATIENT-LVL IV: CPT | Mod: PBBFAC,,, | Performed by: ORTHOPAEDIC SURGERY

## 2024-10-03 NOTE — PROGRESS NOTES
49 years old reports pain in her right hand and wrist for several months time.  Patient reports have had carpal tunnel release surgery over year ago having similar symptoms.  Pain is 0 good days 6 on bad days.  Pain is relieved with soaking in warm water she has tried braces but she seems to take them off in the middle of the night without realizing it     Exam shows no signs infection weakly positive Tinel's at the wrist well-perfused distally flexors and extensors are intact     X-rays show mild degenerative changes     Assessment:  Chronic right hand and wrist pain arthrosis possible recurrent carpal tunnel syndrome     Plan: Symptomatic care bracing, I am reluctant to move forward with revision surgery, follow-up as needed

## 2024-10-07 DIAGNOSIS — E11.65 TYPE 2 DIABETES MELLITUS WITH HYPERGLYCEMIA, WITH LONG-TERM CURRENT USE OF INSULIN: Chronic | ICD-10-CM

## 2024-10-07 DIAGNOSIS — Z79.4 TYPE 2 DIABETES MELLITUS WITH HYPERGLYCEMIA, WITH LONG-TERM CURRENT USE OF INSULIN: Chronic | ICD-10-CM

## 2024-10-07 RX ORDER — SEMAGLUTIDE 0.68 MG/ML
INJECTION, SOLUTION SUBCUTANEOUS
Qty: 3 ML | Refills: 2 | Status: SHIPPED | OUTPATIENT
Start: 2024-10-07

## 2024-10-10 ENCOUNTER — HOSPITAL ENCOUNTER (EMERGENCY)
Facility: HOSPITAL | Age: 49
Discharge: HOME | End: 2024-10-10
Attending: EMERGENCY MEDICINE
Payer: MEDICAID

## 2024-10-10 VITALS
RESPIRATION RATE: 20 BRPM | HEART RATE: 98 BPM | TEMPERATURE: 98 F | HEIGHT: 62 IN | SYSTOLIC BLOOD PRESSURE: 118 MMHG | BODY MASS INDEX: 33.13 KG/M2 | WEIGHT: 180 LBS | OXYGEN SATURATION: 98 % | DIASTOLIC BLOOD PRESSURE: 61 MMHG

## 2024-10-10 DIAGNOSIS — R73.9 HYPERGLYCEMIA: Primary | ICD-10-CM

## 2024-10-10 LAB
ALBUMIN SERPL BCP-MCNC: 3.6 G/DL (ref 3.5–5.2)
ALP SERPL-CCNC: 106 U/L (ref 55–135)
ALT SERPL W/O P-5'-P-CCNC: 34 U/L (ref 10–44)
ANION GAP SERPL CALC-SCNC: 7 MMOL/L (ref 8–16)
AST SERPL-CCNC: 41 U/L (ref 10–40)
B-HCG UR QL: NEGATIVE
BACTERIA #/AREA URNS HPF: ABNORMAL /HPF
BASOPHILS # BLD AUTO: 0.03 K/UL (ref 0–0.2)
BASOPHILS NFR BLD: 0.5 % (ref 0–1.9)
BILIRUB SERPL-MCNC: 0.3 MG/DL (ref 0.1–1)
BILIRUB UR QL STRIP: NEGATIVE
BUN SERPL-MCNC: 10 MG/DL (ref 6–20)
CALCIUM SERPL-MCNC: 9.1 MG/DL (ref 8.7–10.5)
CHLORIDE SERPL-SCNC: 100 MMOL/L (ref 95–110)
CLARITY UR: CLEAR
CO2 SERPL-SCNC: 23 MMOL/L (ref 23–29)
COLOR UR: COLORLESS
CREAT SERPL-MCNC: 0.5 MG/DL (ref 0.5–1.4)
CTP QC/QA: YES
DIFFERENTIAL METHOD BLD: NORMAL
EOSINOPHIL # BLD AUTO: 0 K/UL (ref 0–0.5)
EOSINOPHIL NFR BLD: 0.5 % (ref 0–8)
ERYTHROCYTE [DISTWIDTH] IN BLOOD BY AUTOMATED COUNT: 13.5 % (ref 11.5–14.5)
EST. GFR  (NO RACE VARIABLE): >60 ML/MIN/1.73 M^2
GLUCOSE SERPL-MCNC: 424 MG/DL (ref 70–110)
GLUCOSE UR QL STRIP: ABNORMAL
HCT VFR BLD AUTO: 39.3 % (ref 37–48.5)
HGB BLD-MCNC: 13.1 G/DL (ref 12–16)
HGB UR QL STRIP: NEGATIVE
IMM GRANULOCYTES # BLD AUTO: 0 K/UL (ref 0–0.04)
IMM GRANULOCYTES NFR BLD AUTO: 0 % (ref 0–0.5)
KETONES UR QL STRIP: ABNORMAL
LEUKOCYTE ESTERASE UR QL STRIP: ABNORMAL
LYMPHOCYTES # BLD AUTO: 2.3 K/UL (ref 1–4.8)
LYMPHOCYTES NFR BLD: 39.4 % (ref 18–48)
MCH RBC QN AUTO: 28.7 PG (ref 27–31)
MCHC RBC AUTO-ENTMCNC: 33.3 G/DL (ref 32–36)
MCV RBC AUTO: 86 FL (ref 82–98)
MICROSCOPIC COMMENT: ABNORMAL
MONOCYTES # BLD AUTO: 0.8 K/UL (ref 0.3–1)
MONOCYTES NFR BLD: 12.7 % (ref 4–15)
NEUTROPHILS # BLD AUTO: 2.8 K/UL (ref 1.8–7.7)
NEUTROPHILS NFR BLD: 46.9 % (ref 38–73)
NITRITE UR QL STRIP: NEGATIVE
NRBC BLD-RTO: 0 /100 WBC
PH UR STRIP: 7 [PH] (ref 5–8)
PLATELET # BLD AUTO: 163 K/UL (ref 150–450)
PMV BLD AUTO: 9.5 FL (ref 9.2–12.9)
POCT GLUCOSE: 334 MG/DL (ref 70–110)
POCT GLUCOSE: 401 MG/DL (ref 70–110)
POCT GLUCOSE: 414 MG/DL (ref 70–110)
POTASSIUM SERPL-SCNC: 4.3 MMOL/L (ref 3.5–5.1)
PROT SERPL-MCNC: 6.2 G/DL (ref 6–8.4)
PROT UR QL STRIP: NEGATIVE
RBC # BLD AUTO: 4.57 M/UL (ref 4–5.4)
RBC #/AREA URNS HPF: 4 /HPF (ref 0–4)
SODIUM SERPL-SCNC: 130 MMOL/L (ref 136–145)
SP GR UR STRIP: >1.03 (ref 1–1.03)
SQUAMOUS #/AREA URNS HPF: 3 /HPF
URN SPEC COLLECT METH UR: ABNORMAL
UROBILINOGEN UR STRIP-ACNC: NEGATIVE EU/DL
WBC # BLD AUTO: 5.92 K/UL (ref 3.9–12.7)
WBC #/AREA URNS HPF: 11 /HPF (ref 0–5)
YEAST URNS QL MICRO: ABNORMAL

## 2024-10-10 PROCEDURE — 81003 URINALYSIS AUTO W/O SCOPE: CPT | Performed by: EMERGENCY MEDICINE

## 2024-10-10 PROCEDURE — 96374 THER/PROPH/DIAG INJ IV PUSH: CPT

## 2024-10-10 PROCEDURE — 80053 COMPREHEN METABOLIC PANEL: CPT | Performed by: EMERGENCY MEDICINE

## 2024-10-10 PROCEDURE — 96361 HYDRATE IV INFUSION ADD-ON: CPT

## 2024-10-10 PROCEDURE — 87086 URINE CULTURE/COLONY COUNT: CPT | Performed by: EMERGENCY MEDICINE

## 2024-10-10 PROCEDURE — 63600175 PHARM REV CODE 636 W HCPCS: Performed by: EMERGENCY MEDICINE

## 2024-10-10 PROCEDURE — 81001 URINALYSIS AUTO W/SCOPE: CPT | Performed by: EMERGENCY MEDICINE

## 2024-10-10 PROCEDURE — 85025 COMPLETE CBC W/AUTO DIFF WBC: CPT | Performed by: EMERGENCY MEDICINE

## 2024-10-10 PROCEDURE — 25000003 PHARM REV CODE 250: Performed by: EMERGENCY MEDICINE

## 2024-10-10 PROCEDURE — 82962 GLUCOSE BLOOD TEST: CPT

## 2024-10-10 PROCEDURE — 99284 EMERGENCY DEPT VISIT MOD MDM: CPT | Mod: 25

## 2024-10-10 PROCEDURE — 81025 URINE PREGNANCY TEST: CPT | Performed by: EMERGENCY MEDICINE

## 2024-10-10 RX ADMIN — INSULIN HUMAN 6 UNITS: 100 INJECTION, SOLUTION PARENTERAL at 08:10

## 2024-10-10 RX ADMIN — SODIUM CHLORIDE 1000 ML: 9 INJECTION, SOLUTION INTRAVENOUS at 08:10

## 2024-10-10 NOTE — ED PROVIDER NOTES
"Chief complaint:  Hyperglycemia (Pts dexcom states "high". BG in triage 401. Has tremors. No insulin this am, came straight here)      HPI:  Nicole Coker is a 49 y.o. female with hx bipolar d/o, IDDM presenting with hyperglycemia with blood glucose in the 400s starting yesterday.  She notes urinary frequency with polyuria with some mild lightheadedness associated without other symptoms.  No other urinary complaints such as hematuria, dysuria.  No flank pain or fever.  No associated abdominal pain.  No change in antihyperglycemic regimen or diet.  In regard to tremors mentioned in triage note.  There is no change or recent difference.    ROS: As per HPI and below:  No syncope, chest pain, dyspnea, cough, abdominal pain, headache, confusion, focal numbness or weakness, seizure, fever, rash.    Review of patient's allergies indicates:   Allergen Reactions    Oxycodone-acetaminophen Hives       Discharge Medication List as of 10/10/2024 10:03 AM        CONTINUE these medications which have NOT CHANGED    Details   albuterol-ipratropium (DUO-NEB) 2.5 mg-0.5 mg/3 mL nebulizer solution Take 3 mLs by nebulization every 6 (six) hours as needed for Wheezing. Rescue, Starting Fri 10/27/2023, Until Sat 10/26/2024 at 2359, Normal      arformoteroL (BROVANA) 15 mcg/2 mL Nebu Take 2 mLs (15 mcg total) by nebulization every 12 (twelve) hours. Controller, Starting Thu 5/18/2023, Normal      ARIPiprazole (ABILIFY) 5 MG Tab Take 5 mg by mouth once daily., Starting Thu 9/15/2022, Historical Med      atorvastatin (LIPITOR) 40 MG tablet TAKE 1 TABLET BY MOUTH ONCE DAILY IN THE EVENING, Normal      azelastine (ASTELIN) 137 mcg (0.1 %) nasal spray 1 spray (137 mcg total) by Nasal route 2 (two) times daily., Starting Thu 9/29/2022, Normal      blood sugar diagnostic Strp 1 strip by Misc.(Non-Drug; Combo Route) route 3 (three) times daily. Contour next EZ test strips, Starting Tue 7/12/2022, Normal      blood-glucose meter kit Use as " instructed, Normal      blood-glucose sensor (DEXCOM G7 SENSOR) Tanvi 1 Device by Misc.(Non-Drug; Combo Route) route every 10 days., Starting Thu 1/11/2024, Until Fri 1/10/2025, Normal      cetirizine (ZYRTEC) 10 MG tablet Take 1 tablet by mouth once daily, Normal      CHILDREN'S ASPIRIN 81 mg Chew Take 81 mg by mouth., Starting Wed 2/8/2023, Historical Med      diclofenac (VOLTAREN) 75 MG EC tablet Take 1 tablet (75 mg total) by mouth 2 (two) times daily as needed (back pain)., Starting Tue 3/28/2023, Normal      divalproex ER (DEPAKOTE) 500 MG Tb24 Take 1,000 mg by mouth once daily. , Starting Sun 11/10/2019, Historical Med      ergocalciferol (ERGOCALCIFEROL) 50,000 unit Cap Take 1 capsule (50,000 Units total) by mouth every 7 days., Starting Wed 5/31/2023, Normal      fluticasone propionate (FLONASE) 50 mcg/actuation nasal spray Use 1 spray(s) in each nostril twice daily, Normal      gabapentin (NEURONTIN) 300 MG capsule Take 1 capsule (300 mg total) by mouth every evening., Starting Tue 3/28/2023, Until Mon 8/12/2024, Normal      hydrOXYzine pamoate (VISTARIL) 25 MG Cap Take 1 capsule by mouth nightly as needed., Starting Mon 5/20/2019, Historical Med      insulin (LANTUS SOLOSTAR U-100 INSULIN) glargine 100 units/mL SubQ pen INJECT 65 UNITS SUBCUTANEOUSLY IN THE MORNING  AND 65 UNITS SUBCUTANEOUSLY IN THE EVENING ,MAY  TITRATE  UP  2  UNITS  PER  WEEK  TO  REACH  FASTING  GLUCOSE  OF  110. Max dose 85 units daily, Normal      lancets 32 gauge Misc 1 lancet by Misc.(Non-Drug; Combo Route) route 3 (three) times daily as needed., Starting Thu 7/14/2022, Normal      linaCLOtide (LINZESS) 72 mcg Cap capsule Take 1 capsule (72 mcg total) by mouth before breakfast., Starting Mon 8/12/2024, Normal      montelukast (SINGULAIR) 10 mg tablet Take 1 tablet (10 mg total) by mouth every evening., Starting Wed 5/15/2024, Normal      nebulizer and compressor (COMP-AIR NEBULIZER COMPRESSOR) Tanvi To be used to nebulize  "medication, Starting Wed 7/6/2022, Normal      norgestimate-ethinyl estradioL (ORTHO-CYCLEN) 0.25-35 mg-mcg per tablet Take 1 tablet by mouth once daily., Starting Tue 10/17/2023, Until Wed 10/16/2024, Normal      ondansetron (ZOFRAN-ODT) 4 MG TbDL Take 1 tablet (4 mg total) by mouth every 6 (six) hours as needed (nausea)., Starting Wed 8/23/2023, Normal      pen needle, diabetic (BD ULTRA-FINE SHORT PEN NEEDLE) 31 gauge x 5/16" Ndle Inject 1 Needle into the skin 2 (two) times a day., Starting Mon 4/15/2024, Normal      primidone (MYSOLINE) 50 MG Tab Take 100 mg by mouth 3 (three) times daily., Historical Med      semaglutide (OZEMPIC) 0.25 mg or 0.5 mg (2 mg/3 mL) pen injector INJECT 0.5MG INTO THE SKIN EVERY 7 DAYS, Normal      sertraline (ZOLOFT) 50 MG tablet Take 50 mg by mouth., Starting Sun 5/14/2023, Historical Med      sumatriptan (IMITREX) 100 MG tablet Take 1 tablet (100 mg total) by mouth as needed (Take at onset of the headache and repeat 2 hours later if needed.  Max 2 tabs in 24 hours.)., Starting Wed 5/31/2023, Normal      TENS unit and electrodes Cmpk 1 each by Misc.(Non-Drug; Combo Route) route once daily., Starting Thu 9/29/2022, Print      tiZANidine (ZANAFLEX) 4 MG tablet Take 1 tablet (4 mg total) by mouth every 8 (eight) hours., Starting Wed 12/28/2022, Normal      traZODone (DESYREL) 100 MG tablet Take 150 mg by mouth nightly as needed., Starting Mon 4/10/2023, Historical Med             PMH:  As per HPI and below:  Past Medical History:   Diagnosis Date    Allergy     Anxiety     Asthma     Back pain     Behavioral problem     Bipolar disorder     Depression     Diabetes mellitus type I 2018    Digestive disorder     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    Headache(784.0)     Hx of psychiatric care     Hypercholesteremia     Left knee pain 2018    pain worse- some swelling- gave out & recently fell- surg scheduled    Migraines     occ    Psychiatric problem     Psychosis     Therapy  "     Past Surgical History:   Procedure Laterality Date    ARTHROSCOPY OF KNEE Left 7/27/2020    Procedure: ARTHROSCOPY, KNEE;  Surgeon: Hardy Delgado MD;  Location: [facility] OR;  Service: Orthopedics;  Laterality: Left;    CARPAL TUNNEL RELEASE Right 1/11/2023    Procedure: RELEASE, CARPAL TUNNEL;  Surgeon: Hardy Delgado MD;  Location: [facility] OR;  Service: Orthopedics;  Laterality: Right;    CHONDROPLASTY OF KNEE  11/13/2019    Procedure: CHONDROPLASTY, KNEE;  Surgeon: Hardy Delgado MD;  Location: [facility] OR;  Service: Orthopedics;;    HAND SURGERY      KNEE ARTHROPLASTY Left 7/27/2020    Procedure: ARTHROPLASTY, KNEE patellofemoral replacement;  Surgeon: Hardy Delgado MD;  Location: [facility] OR;  Service: Orthopedics;  Laterality: Left;  ARTHOSURFACE-NEGRO.  PROCEDURE IS PATELLA FEMORAL REPLACEMENT, NOT TKA    KNEE ARTHROSCOPY W/ MENISCECTOMY Left 11/13/2019    Procedure: ARTHROSCOPY, KNEE, WITH MENISCECTOMY;  Surgeon: Hardy Delgado MD;  Location: [facility] OR;  Service: Orthopedics;  Laterality: Left;    KNEE ARTHROSCOPY W/ MENISCECTOMY Left 12/1/2021    Procedure: ARTHROSCOPY, KNEE, WITH MENISCECTOMY;  Surgeon: Hardy Delgado MD;  Location: [facility] OR;  Service: Orthopedics;  Laterality: Left;    SHOULDER SURGERY         Social History     Socioeconomic History    Marital status:    Occupational History    Occupation: Unemployed     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   Tobacco Use    Smoking status: Former     Current packs/day: 0.00     Average packs/day: 0.3 packs/day for 1 year (0.3 ttl pk-yrs)     Types: Cigars, Cigarettes     Start date: 4/6/2020     Quit date: 4/6/2021     Years since quitting: 3.5    Smokeless tobacco: Never   Substance and Sexual Activity    Alcohol use: Not Currently    Drug use: No    Sexual activity: Yes     Partners: Male     Social Drivers of Health     Financial Resource Strain: Low Risk  (12/13/2023)    Overall Financial Resource Strain (CARDIA)     Difficulty  of Paying Living Expenses: Not hard at all   Food Insecurity: No Food Insecurity (12/13/2023)    Hunger Vital Sign     Worried About Running Out of Food in the Last Year: Never true     Ran Out of Food in the Last Year: Never true   Transportation Needs: No Transportation Needs (12/13/2023)    PRAPARE - Transportation     Lack of Transportation (Medical): No     Lack of Transportation (Non-Medical): No   Physical Activity: Inactive (12/13/2023)    Exercise Vital Sign     Days of Exercise per Week: 4 days     Minutes of Exercise per Session: 0 min   Stress: Stress Concern Present (12/13/2023)    [employer] - Occupational Stress Questionnaire     Feeling of Stress : To some extent   Housing Stability: High Risk (12/13/2023)    Housing Stability Vital Sign     Unable to Pay for Housing in the Last Year: No     Number of Places Lived in the Last Year: 9     Unstable Housing in the Last Year: No       Family History   Problem Relation Name Age of Onset    Alcohol abuse Mother      Cirrhosis Mother      Early death Mother      Alcohol abuse Father      Alcohol abuse Sister      Anxiety disorder Sister      Alcohol abuse Brother      Alcohol abuse Maternal Uncle      Drug abuse Maternal Uncle      Suicide Maternal Uncle      Diabetes Maternal Grandmother      Breast cancer Paternal Aunt      Breast cancer Maternal Aunt         Physical Exam:    Vitals:    10/10/24 1000   BP: 118/61   Pulse: 98   Resp: 20   Temp: 98 °F (36.7 °C)     GENERAL:  No apparent distress.  Alert.    HEENT:  Dry mucous membranes.  Normocephalic and atraumatic.    NECK:  No swelling.  Midline trachea.   CARDIOVASCULAR:  Mild tachycardia with regular rhythm.  2+ radial pulses.  No murmur.  PULMONARY:  Lungs clear to auscultation bilaterally.  No wheezes, rales, or rhonci.  No tachypnea.  ABDOMEN:  Non-tender and non-distended.    EXTREMITIES:  Warm and well perfused.  Brisk capillary refill.  No peripheral edema.  No  resting tremor.  NEUROLOGICAL:  Normal mental status.  Appropriate and conversant.    SKIN:  No rashes or ecchymoses.    BACK:  Atraumatic.  No CVA tenderness to palpation.      Labs Reviewed   COMPREHENSIVE METABOLIC PANEL - Abnormal       Result Value    Sodium 130 (*)     Potassium 4.3      Chloride 100      CO2 23      Glucose 424 (*)     BUN 10      Creatinine 0.5      Calcium 9.1      Total Protein 6.2      Albumin 3.6      Total Bilirubin 0.3      Alkaline Phosphatase 106      AST 41 (*)     ALT 34      eGFR >60.0      Anion Gap 7 (*)    URINALYSIS, REFLEX TO URINE CULTURE - Abnormal    Specimen UA Urine, Clean Catch      Color, UA Colorless (*)     Appearance, UA Clear      pH, UA 7.0      Specific Gravity, UA >1.030 (*)     Protein, UA Negative      Glucose, UA 4+ (*)     Ketones, UA 1+ (*)     Bilirubin (UA) Negative      Occult Blood UA Negative      Nitrite, UA Negative      Urobilinogen, UA Negative      Leukocytes, UA 1+ (*)     Narrative:     Specimen Source->Urine   URINALYSIS MICROSCOPIC - Abnormal    RBC, UA 4      WBC, UA 11 (*)     Bacteria Rare      Yeast, UA Rare (*)     Squam Epithel, UA 3      Microscopic Comment SEE COMMENT      Narrative:     Specimen Source->Urine   POCT GLUCOSE - Abnormal    POCT Glucose 401 (*)    POCT GLUCOSE - Abnormal    POCT Glucose 414 (*)    POCT GLUCOSE - Abnormal    POCT Glucose 334 (*)    CULTURE, URINE   CBC W/ AUTO DIFFERENTIAL    WBC 5.92      RBC 4.57      Hemoglobin 13.1      Hematocrit 39.3      MCV 86      MCH 28.7      MCHC 33.3      RDW 13.5      Platelets 163      MPV 9.5      Immature Granulocytes 0.0      Gran # (ANC) 2.8      Immature Grans (Abs) 0.00      Lymph # 2.3      Mono # 0.8      Eos # 0.0      Baso # 0.03      nRBC 0      Gran % 46.9      Lymph % 39.4      Mono % 12.7      Eosinophil % 0.5      Basophil % 0.5      Differential Method Automated     POCT URINE PREGNANCY    POC Preg Test, Ur Negative       Acceptable Yes          Discharge Medication List as of 10/10/2024 10:03 AM        CONTINUE these medications which have NOT CHANGED    Details   albuterol-ipratropium (DUO-NEB) 2.5 mg-0.5 mg/3 mL nebulizer solution Take 3 mLs by nebulization every 6 (six) hours as needed for Wheezing. Rescue, Starting Fri 10/27/2023, Until Sat 10/26/2024 at 2359, Normal      arformoteroL (BROVANA) 15 mcg/2 mL Nebu Take 2 mLs (15 mcg total) by nebulization every 12 (twelve) hours. Controller, Starting Thu 5/18/2023, Normal      ARIPiprazole (ABILIFY) 5 MG Tab Take 5 mg by mouth once daily., Starting Thu 9/15/2022, Historical Med      atorvastatin (LIPITOR) 40 MG tablet TAKE 1 TABLET BY MOUTH ONCE DAILY IN THE EVENING, Normal      azelastine (ASTELIN) 137 mcg (0.1 %) nasal spray 1 spray (137 mcg total) by Nasal route 2 (two) times daily., Starting Thu 9/29/2022, Normal      blood sugar diagnostic Strp 1 strip by Misc.(Non-Drug; Combo Route) route 3 (three) times daily. Contour next EZ test strips, Starting Tue 7/12/2022, Normal      blood-glucose meter kit Use as instructed, Normal      blood-glucose sensor (DEXCOM G7 SENSOR) Tanvi 1 Device by Misc.(Non-Drug; Combo Route) route every 10 days., Starting Thu 1/11/2024, Until Fri 1/10/2025, Normal      cetirizine (ZYRTEC) 10 MG tablet Take 1 tablet by mouth once daily, Normal      CHILDREN'S ASPIRIN 81 mg Chew Take 81 mg by mouth., Starting Wed 2/8/2023, Historical Med      diclofenac (VOLTAREN) 75 MG EC tablet Take 1 tablet (75 mg total) by mouth 2 (two) times daily as needed (back pain)., Starting Tue 3/28/2023, Normal      divalproex ER (DEPAKOTE) 500 MG Tb24 Take 1,000 mg by mouth once daily. , Starting Sun 11/10/2019, Historical Med      ergocalciferol (ERGOCALCIFEROL) 50,000 unit Cap Take 1 capsule (50,000 Units total) by mouth every 7 days., Starting Wed 5/31/2023, Normal      fluticasone propionate (FLONASE) 50 mcg/actuation nasal spray Use 1 spray(s) in each nostril twice daily, Normal     "  gabapentin (NEURONTIN) 300 MG capsule Take 1 capsule (300 mg total) by mouth every evening., Starting Tue 3/28/2023, Until Mon 8/12/2024, Normal      hydrOXYzine pamoate (VISTARIL) 25 MG Cap Take 1 capsule by mouth nightly as needed., Starting Mon 5/20/2019, Historical Med      insulin (LANTUS SOLOSTAR U-100 INSULIN) glargine 100 units/mL SubQ pen INJECT 65 UNITS SUBCUTANEOUSLY IN THE MORNING  AND 65 UNITS SUBCUTANEOUSLY IN THE EVENING ,MAY  TITRATE  UP  2  UNITS  PER  WEEK  TO  REACH  FASTING  GLUCOSE  OF  110. Max dose 85 units daily, Normal      lancets 32 gauge Misc 1 lancet by Misc.(Non-Drug; Combo Route) route 3 (three) times daily as needed., Starting Thu 7/14/2022, Normal      linaCLOtide (LINZESS) 72 mcg Cap capsule Take 1 capsule (72 mcg total) by mouth before breakfast., Starting Mon 8/12/2024, Normal      montelukast (SINGULAIR) 10 mg tablet Take 1 tablet (10 mg total) by mouth every evening., Starting Wed 5/15/2024, Normal      nebulizer and compressor (COMP-AIR NEBULIZER COMPRESSOR) Tanvi To be used to nebulize medication, Starting Wed 7/6/2022, Normal      norgestimate-ethinyl estradioL (ORTHO-CYCLEN) 0.25-35 mg-mcg per tablet Take 1 tablet by mouth once daily., Starting Tue 10/17/2023, Until Wed 10/16/2024, Normal      ondansetron (ZOFRAN-ODT) 4 MG TbDL Take 1 tablet (4 mg total) by mouth every 6 (six) hours as needed (nausea)., Starting Wed 8/23/2023, Normal      pen needle, diabetic (BD ULTRA-FINE SHORT PEN NEEDLE) 31 gauge x 5/16" Ndle Inject 1 Needle into the skin 2 (two) times a day., Starting Mon 4/15/2024, Normal      primidone (MYSOLINE) 50 MG Tab Take 100 mg by mouth 3 (three) times daily., Historical Med      semaglutide (OZEMPIC) 0.25 mg or 0.5 mg (2 mg/3 mL) pen injector INJECT 0.5MG INTO THE SKIN EVERY 7 DAYS, Normal      sertraline (ZOLOFT) 50 MG tablet Take 50 mg by mouth., Starting Sun 5/14/2023, Historical Med      sumatriptan (IMITREX) 100 MG tablet Take 1 tablet (100 mg total) by " mouth as needed (Take at onset of the headache and repeat 2 hours later if needed.  Max 2 tabs in 24 hours.)., Starting Wed 5/31/2023, Normal      TENS unit and electrodes Cmpk 1 each by Misc.(Non-Drug; Combo Route) route once daily., Starting Thu 9/29/2022, Print      tiZANidine (ZANAFLEX) 4 MG tablet Take 1 tablet (4 mg total) by mouth every 8 (eight) hours., Starting Wed 12/28/2022, Normal      traZODone (DESYREL) 100 MG tablet Take 150 mg by mouth nightly as needed., Starting Mon 4/10/2023, Historical Med             Orders Placed This Encounter   Procedures    Urine culture    CBC auto differential    Comprehensive metabolic panel    Urinalysis, Reflex to Urine Culture Urine, Clean Catch    Urinalysis Microscopic    POCT urine pregnancy    Insert Saline lock IV       Imaging Results    None              MDM:    49 y.o. female with hyperglycemia starting yesterday with minor associated symptoms of lightheadedness and frequency with polyuria.  Low suspicion for UTI with urinalysis sent.  No other obvious provoking issue.  She is well-appearing with low suspicion for DKA and laboratories sent to exclude end-organ dysfunction including DKA, electrolyte derangement, renal insult, hepatic dysfunction.  There is no abdominal pain or tenderness with very low suspicion for alternative life-threatening intra-abdominal process such as pancreatitis, abscess, cholecystitis, appendicitis.  I do not think emergent imaging is indicated.  IV fluids given for resuscitation in addition to regular insulin pending laboratories.  Labs reviewed with patient with no sign of DKA.  No MANJIT.  She is appropriate for discharge with close follow-up.  Continue antihyperglycemic regimen.    Diagnoses:    1. Hyperglycemia       Keshawn Llanes MD  10/10/24 [id number]

## 2024-10-11 LAB
BACTERIA UR CULT: NORMAL
BACTERIA UR CULT: NORMAL

## 2024-10-14 ENCOUNTER — TELEPHONE (OUTPATIENT)
Dept: FAMILY MEDICINE | Facility: CLINIC | Age: 49
End: 2024-10-14
Payer: MEDICAID

## 2024-10-14 ENCOUNTER — PATIENT MESSAGE (OUTPATIENT)
Dept: EMERGENCY MEDICINE | Facility: HOSPITAL | Age: 49
End: 2024-10-14

## 2024-10-14 NOTE — TELEPHONE ENCOUNTER
----- Message from Camelia sent at 10/10/2024 12:16 PM CDT -----  Pt just left [facility] a couple of hours ago due to high sugar and needs a hospital f/u. Pt was not admitted.   [phone]

## 2024-10-16 ENCOUNTER — PATIENT MESSAGE (OUTPATIENT)
Dept: FAMILY MEDICINE | Facility: CLINIC | Age: 49
End: 2024-10-16
Payer: MEDICAID

## 2024-10-30 ENCOUNTER — PATIENT MESSAGE (OUTPATIENT)
Dept: ADMINISTRATIVE | Facility: OTHER | Age: 49
End: 2024-10-30
Payer: MEDICAID

## 2024-11-12 ENCOUNTER — HOSPITAL ENCOUNTER (OUTPATIENT)
Dept: RADIOLOGY | Facility: HOSPITAL | Age: 49
Discharge: HOME | End: 2024-11-12
Attending: INTERNAL MEDICINE
Payer: MEDICAID

## 2024-11-12 DIAGNOSIS — Z12.31 SCREENING MAMMOGRAM FOR BREAST CANCER: ICD-10-CM

## 2024-11-12 PROCEDURE — 77063 BREAST TOMOSYNTHESIS BI: CPT | Mod: TC,PO

## 2024-11-12 PROCEDURE — 77063 BREAST TOMOSYNTHESIS BI: CPT | Mod: 26,,, | Performed by: RADIOLOGY

## 2024-11-12 PROCEDURE — 77067 SCR MAMMO BI INCL CAD: CPT | Mod: 26,,, | Performed by: RADIOLOGY

## 2024-11-25 ENCOUNTER — TELEPHONE (OUTPATIENT)
Dept: FAMILY MEDICINE | Facility: CLINIC | Age: 49
End: 2024-11-25
Payer: MEDICAID

## 2024-11-25 NOTE — TELEPHONE ENCOUNTER
----- Message from Faith sent at 11/25/2024  2:14 PM CST -----  Pt had some test done by her eye doctor Dr. Negron at [facility] and they told her it was her thryroid per the bloodwork. Had the blood work at [facility] last week      [phone]

## 2024-12-01 PROBLEM — R76.8 THYROGLOBULIN ANTIBODY POSITIVE: Status: ACTIVE | Noted: 2024-12-01

## 2024-12-02 ENCOUNTER — TELEPHONE (OUTPATIENT)
Dept: FAMILY MEDICINE | Facility: CLINIC | Age: 49
End: 2024-12-02
Payer: MEDICAID

## 2024-12-02 NOTE — TELEPHONE ENCOUNTER
----- Message from Med Assistant Lulu sent at 12/2/2024 11:55 AM CST -----  Patient states she was diagnosed with thyroid issues and told to get an appointment with Dr. Gee as soon as possible.     Call back number is [phone]

## 2025-01-02 ENCOUNTER — PATIENT MESSAGE (OUTPATIENT)
Dept: ADMINISTRATIVE | Facility: HOSPITAL | Age: 50
End: 2025-01-02
Payer: MEDICAID

## 2025-01-15 ENCOUNTER — TELEPHONE (OUTPATIENT)
Dept: ORTHOPEDICS | Facility: CLINIC | Age: 50
End: 2025-01-15
Payer: MEDICAID

## 2025-01-15 NOTE — TELEPHONE ENCOUNTER
----- Message from Nurse Duncan sent at 1/15/2025  9:36 AM CST -----    ----- Message -----  From: Yadira Hood  Sent: 1/15/2025   9:35 AM CST  To: [facility] Staff    Type:  Sooner Apoointment Request    Caller is requesting a sooner appointment.  Caller declined first available appointment listed below.  Caller will not accept being placed on the waitlist and is requesting a message be sent to doctor.  Name of Caller: Pt  When is the first available appointment?  Symptoms: pain of left knee  Would the patient rather a call back or a response via [facility]? [facility]  Best Call Back Number:  [phone]  Additional Information: Pt. States she is experiencing pain of her left knee

## 2025-01-29 ENCOUNTER — OFFICE VISIT (OUTPATIENT)
Dept: FAMILY MEDICINE | Facility: CLINIC | Age: 50
End: 2025-01-29
Payer: MEDICAID

## 2025-01-29 VITALS
DIASTOLIC BLOOD PRESSURE: 70 MMHG | TEMPERATURE: 98 F | BODY MASS INDEX: 32.39 KG/M2 | HEIGHT: 62 IN | SYSTOLIC BLOOD PRESSURE: 110 MMHG | OXYGEN SATURATION: 98 % | WEIGHT: 176 LBS | HEART RATE: 95 BPM

## 2025-01-29 DIAGNOSIS — N92.6 IRREGULAR MENSTRUATION: ICD-10-CM

## 2025-01-29 DIAGNOSIS — M25.562 CHRONIC PAIN OF LEFT KNEE: ICD-10-CM

## 2025-01-29 DIAGNOSIS — Z01.419 ENCOUNTER FOR WELL WOMAN EXAM WITH ROUTINE GYNECOLOGICAL EXAM: ICD-10-CM

## 2025-01-29 DIAGNOSIS — Z79.4 TYPE 2 DIABETES MELLITUS WITH HYPERGLYCEMIA, WITH LONG-TERM CURRENT USE OF INSULIN: Chronic | ICD-10-CM

## 2025-01-29 DIAGNOSIS — E11.9 TYPE 2 DIABETES MELLITUS WITHOUT COMPLICATION, WITH LONG-TERM CURRENT USE OF INSULIN: Chronic | ICD-10-CM

## 2025-01-29 DIAGNOSIS — E11.65 TYPE 2 DIABETES MELLITUS WITH HYPERGLYCEMIA, WITH LONG-TERM CURRENT USE OF INSULIN: Chronic | ICD-10-CM

## 2025-01-29 DIAGNOSIS — G43.801 OTHER MIGRAINE WITH STATUS MIGRAINOSUS, NOT INTRACTABLE: Primary | ICD-10-CM

## 2025-01-29 DIAGNOSIS — R41.89 COGNITIVE DEFICITS: ICD-10-CM

## 2025-01-29 DIAGNOSIS — Z79.4 TYPE 2 DIABETES MELLITUS WITHOUT COMPLICATION, WITH LONG-TERM CURRENT USE OF INSULIN: Chronic | ICD-10-CM

## 2025-01-29 DIAGNOSIS — B37.31 VULVOVAGINAL CANDIDIASIS: ICD-10-CM

## 2025-01-29 DIAGNOSIS — G89.29 CHRONIC PAIN OF LEFT KNEE: ICD-10-CM

## 2025-01-29 DIAGNOSIS — R09.82 POST-NASAL DRAINAGE: ICD-10-CM

## 2025-01-29 DIAGNOSIS — E55.9 VITAMIN D DEFICIENCY: ICD-10-CM

## 2025-01-29 DIAGNOSIS — G51.0 FACIAL PARALYSIS/BELLS PALSY: ICD-10-CM

## 2025-01-29 DIAGNOSIS — R09.81 SINUS CONGESTION: ICD-10-CM

## 2025-01-29 DIAGNOSIS — J30.1 ALLERGIC RHINITIS DUE TO POLLEN, UNSPECIFIED SEASONALITY: ICD-10-CM

## 2025-01-29 DIAGNOSIS — Z79.4 TYPE 2 DIABETES MELLITUS WITHOUT COMPLICATION, WITH LONG-TERM CURRENT USE OF INSULIN: ICD-10-CM

## 2025-01-29 DIAGNOSIS — Z79.890 HORMONE REPLACEMENT THERAPY: ICD-10-CM

## 2025-01-29 DIAGNOSIS — E11.9 TYPE 2 DIABETES MELLITUS WITHOUT COMPLICATION, WITH LONG-TERM CURRENT USE OF INSULIN: ICD-10-CM

## 2025-01-29 PROCEDURE — 1159F MED LIST DOCD IN RCRD: CPT | Mod: CPTII,,, | Performed by: NURSE PRACTITIONER

## 2025-01-29 PROCEDURE — 1160F RVW MEDS BY RX/DR IN RCRD: CPT | Mod: CPTII,,, | Performed by: NURSE PRACTITIONER

## 2025-01-29 PROCEDURE — 3078F DIAST BP <80 MM HG: CPT | Mod: CPTII,,, | Performed by: NURSE PRACTITIONER

## 2025-01-29 PROCEDURE — 3074F SYST BP LT 130 MM HG: CPT | Mod: CPTII,,, | Performed by: NURSE PRACTITIONER

## 2025-01-29 PROCEDURE — 3008F BODY MASS INDEX DOCD: CPT | Mod: CPTII,,, | Performed by: NURSE PRACTITIONER

## 2025-01-29 PROCEDURE — 99215 OFFICE O/P EST HI 40 MIN: CPT | Mod: PBBFAC,PN | Performed by: NURSE PRACTITIONER

## 2025-01-29 PROCEDURE — 99214 OFFICE O/P EST MOD 30 MIN: CPT | Mod: S$PBB,,, | Performed by: NURSE PRACTITIONER

## 2025-01-29 PROCEDURE — 99999 PR PBB SHADOW E&M-EST. PATIENT-LVL V: CPT | Mod: PBBFAC,,, | Performed by: NURSE PRACTITIONER

## 2025-01-29 RX ORDER — MONTELUKAST SODIUM 10 MG/1
10 TABLET ORAL NIGHTLY
Qty: 30 TABLET | Refills: 6 | Status: SHIPPED | OUTPATIENT
Start: 2025-01-29

## 2025-01-29 RX ORDER — FLUTICASONE PROPIONATE 50 MCG
1 SPRAY, SUSPENSION (ML) NASAL 2 TIMES DAILY
Qty: 16 G | Refills: 5 | Status: SHIPPED | OUTPATIENT
Start: 2025-01-29

## 2025-01-29 RX ORDER — ERGOCALCIFEROL 1.25 MG/1
50000 CAPSULE ORAL
Qty: 4 CAPSULE | Refills: 5 | Status: SHIPPED | OUTPATIENT
Start: 2025-01-29

## 2025-01-29 RX ORDER — BLOOD-GLUCOSE SENSOR
1 EACH MISCELLANEOUS
Qty: 3 EACH | Refills: 5 | Status: SHIPPED | OUTPATIENT
Start: 2025-01-29 | End: 2026-01-29

## 2025-01-29 RX ORDER — NORGESTIMATE AND ETHINYL ESTRADIOL 0.25-0.035
1 KIT ORAL DAILY
Qty: 28 TABLET | Refills: 11 | Status: SHIPPED | OUTPATIENT
Start: 2025-01-29 | End: 2026-01-29

## 2025-01-29 RX ORDER — FLUCONAZOLE 150 MG/1
150 TABLET ORAL DAILY
Qty: 1 TABLET | Refills: 0 | Status: SHIPPED | OUTPATIENT
Start: 2025-01-29 | End: 2025-02-01

## 2025-01-29 RX ORDER — INSULIN GLARGINE 100 [IU]/ML
INJECTION, SOLUTION SUBCUTANEOUS
Qty: 30 ML | Refills: 2 | Status: SHIPPED | OUTPATIENT
Start: 2025-01-29

## 2025-01-29 RX ORDER — AZELASTINE 1 MG/ML
1 SPRAY, METERED NASAL 2 TIMES DAILY
Qty: 30 ML | Refills: 5 | Status: SHIPPED | OUTPATIENT
Start: 2025-01-29

## 2025-01-29 RX ORDER — SEMAGLUTIDE 0.68 MG/ML
0.5 INJECTION, SOLUTION SUBCUTANEOUS
Qty: 3 ML | Refills: 5 | Status: SHIPPED | OUTPATIENT
Start: 2025-01-29

## 2025-01-29 NOTE — PROGRESS NOTES
Subjective:       Patient ID: Nicole Coker is a 49 y.o. female.    Chief Complaint: ortho referal and Medication Refill    History of Present Illness    CHIEF COMPLAINT:  Patient presents for a follow-up visit to address GYN issues, including vaginal discharge and discomfort, as well as to obtain medication refills and referrals.    HPI:  Patient reports vaginal discharge with a strong odor in her urine, along with pruritus and discomfort in her genital area, particularly while sitting. She has not been sexually active. Her last menstrual cycle was in November, indicating an absence of regular cycles for several months.    Patient reports ongoing knee pain following a partial left knee replacement surgery in 2019. She describes the pain as radiating from the posterior aspect of her knee superiorly. The surgery was performed by Dr. Delgado, and the patient expresses concern about potential issues with the hardware from the replacement.    For diabetes management, the patient uses a continuous glucose monitor, obtaining sensors from the [facility]. She mentions having 2 boxes of insulin left but needing refills. Patient also notes she is currently out of birth control pills, with her last dose taken in November.    MEDICATIONS:  Patient is on Ozempic and requires a refill. She is also on Lantus (insulin) with 2 boxes remaining and needs a refill. She last took Ortho Tricycline (birth control) in November. Patient is on Flonase and Singulair. She uses a Dexcom sensor every 10 days for checking blood sugar. She is also taking some allergy medicine and Vitamin D.    MEDICAL HISTORY:  Patient has a history of diabetes. Patient's last menstrual period was in November. Her current contraception is Ortho Tricycline, which she last took in November. She is not sexually active. Her pregnancy status is unknown.    SURGICAL HISTORY:  Patient underwent a partial knee replacement on her left knee in 2019.    TEST  RESULTS:  A urine pregnancy test was conducted during the current visit, with results pending.      ROS:  General: -fever, -chills, +fatigue, -weight gain, -weight loss  Eyes: -vision changes, -redness, -discharge  ENT: -ear pain, -nasal congestion, -sore throat  Cardiovascular: -chest pain, -palpitations, -lower extremity edema  Respiratory: -cough, -shortness of breath  Gastrointestinal: -abdominal pain, -nausea, -vomiting, -diarrhea, -constipation, -blood in stool  Genitourinary: -dysuria, -hematuria, -frequency  Musculoskeletal: +joint pain, -muscle pain  Skin: -rash, -lesion, +itching  Neurological: -headache, -dizziness, -numbness, -tingling  Psychiatric: -anxiety, -depression, -sleep difficulty  Female Genitourinary: +vaginal discharge         Past Medical History:   Diagnosis Date    Allergy     Anxiety     Asthma     Back pain     Behavioral problem     Bipolar disorder     Depression     Diabetes mellitus type I 2018    Digestive disorder     Facial paralysis/Fosters palsy 06/11/2019    Headache(784.0)     Hx of psychiatric care     Hypercholesteremia     Left knee pain 2018    pain worse- some swelling- gave out & recently fell- surg scheduled    Migraines     occ    Psychiatric problem     Psychosis     Therapy     Thyroglobulin antibody positive 12/01/2024        Past Surgical History:   Procedure Laterality Date    ARTHROSCOPY OF KNEE Left 7/27/2020    Procedure: ARTHROSCOPY, KNEE;  Surgeon: Hardy Delgado MD;  Location: [facility] OR;  Service: Orthopedics;  Laterality: Left;    CARPAL TUNNEL RELEASE Right 1/11/2023    Procedure: RELEASE, CARPAL TUNNEL;  Surgeon: Hardy Delgado MD;  Location: [facility] OR;  Service: Orthopedics;  Laterality: Right;    CHONDROPLASTY OF KNEE  11/13/2019    Procedure: CHONDROPLASTY, KNEE;  Surgeon: Hardy Delgado MD;  Location: [facility] OR;  Service: Orthopedics;;    HAND SURGERY      KNEE ARTHROPLASTY Left 7/27/2020    Procedure: ARTHROPLASTY, KNEE patellofemoral replacement;  Surgeon:  Hardy Delgado MD;  Location: [facility] OR;  Service: Orthopedics;  Laterality: Left;  ARTHOSURFACE-NEGRO.  PROCEDURE IS PATELLA FEMORAL REPLACEMENT, NOT TKA    KNEE ARTHROSCOPY W/ MENISCECTOMY Left 11/13/2019    Procedure: ARTHROSCOPY, KNEE, WITH MENISCECTOMY;  Surgeon: Hardy Delgado MD;  Location: [facility] OR;  Service: Orthopedics;  Laterality: Left;    KNEE ARTHROSCOPY W/ MENISCECTOMY Left 12/1/2021    Procedure: ARTHROSCOPY, KNEE, WITH MENISCECTOMY;  Surgeon: Hardy Delgado MD;  Location: [facility] OR;  Service: Orthopedics;  Laterality: Left;    SHOULDER SURGERY         Family History   Problem Relation Name Age of Onset    Alcohol abuse Mother      Cirrhosis Mother      Early death Mother      Alcohol abuse Father      Alcohol abuse Sister      Anxiety disorder Sister      Alcohol abuse Brother      Alcohol abuse Maternal Uncle      Drug abuse Maternal Uncle      Suicide Maternal Uncle      Diabetes Maternal Grandmother      Breast cancer Paternal Aunt      Breast cancer Maternal Aunt         Social History     Socioeconomic History    Marital status:    Occupational History    Occupation: Unemployed     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   Tobacco Use    Smoking status: Former     Current packs/day: 0.00     Average packs/day: 0.3 packs/day for 1 year (0.3 ttl pk-yrs)     Types: Cigars, Cigarettes     Start date: 4/6/2020     Quit date: 4/6/2021     Years since quitting: 3.8    Smokeless tobacco: Never   Substance and Sexual Activity    Alcohol use: Not Currently    Drug use: No    Sexual activity: Yes     Partners: Male     Social Drivers of Health     Financial Resource Strain: Low Risk  (1/29/2025)    Overall Financial Resource Strain (CARDIA)     Difficulty of Paying Living Expenses: Not hard at all   Food Insecurity: No Food Insecurity (1/29/2025)    Hunger Vital Sign     Worried About Running Out of Food in the Last Year: Never true     Ran Out of Food in the Last Year:  Never true   Transportation Needs: No Transportation Needs (12/13/2023)    PRAPARE - Transportation     Lack of Transportation (Medical): No     Lack of Transportation (Non-Medical): No   Physical Activity: Insufficiently Active (1/29/2025)    Exercise Vital Sign     Days of Exercise per Week: 3 days     Minutes of Exercise per Session: 20 min   Stress: Stress Concern Present (1/29/2025)    [facility] Occupational [facility] - Occupational Stress Questionnaire     Feeling of Stress : To some extent   Housing Stability: High Risk (12/13/2023)    Housing Stability Vital Sign     Unable to Pay for Housing in the Last Year: No     Number of Places Lived in the Last Year: 9     Unstable Housing in the Last Year: No       Current Outpatient Medications   Medication Sig Dispense Refill    albuterol-ipratropium (DUO-NEB) 2.5 mg-0.5 mg/3 mL nebulizer solution Take 3 mLs by nebulization every 6 (six) hours as needed for Wheezing. Rescue 120 each 4    arformoteroL (BROVANA) 15 mcg/2 mL Nebu Take 2 mLs (15 mcg total) by nebulization every 12 (twelve) hours. Controller 120 mL 6    ARIPiprazole (ABILIFY) 5 MG Tab Take 5 mg by mouth once daily.      atorvastatin (LIPITOR) 40 MG tablet TAKE 1 TABLET BY MOUTH ONCE DAILY IN THE EVENING 90 tablet 1    blood sugar diagnostic Strp 1 strip by Misc.(Non-Drug; Combo Route) route 3 (three) times daily. Contour next EZ test strips 50 strip 6    blood-glucose meter kit Use as instructed 1 each 0    cetirizine (ZYRTEC) 10 MG tablet Take 1 tablet by mouth once daily 30 tablet 2    CHILDREN'S ASPIRIN 81 mg Chew Take 81 mg by mouth.      diclofenac (VOLTAREN) 75 MG EC tablet Take 1 tablet (75 mg total) by mouth 2 (two) times daily as needed (back pain). 30 tablet 2    divalproex ER (DEPAKOTE) 500 MG Tb24 Take 1,000 mg by mouth once daily.   2    gabapentin (NEURONTIN) 300 MG capsule Take 1 capsule (300 mg total) by mouth every evening. 30 capsule 2    hydrOXYzine pamoate (VISTARIL) 25 MG  "Cap Take 1 capsule by mouth nightly as needed.  1    lancets 32 gauge Misc 1 lancet by Misc.(Non-Drug; Combo Route) route 3 (three) times daily as needed. 100 each 5    linaCLOtide (LINZESS) 72 mcg Cap capsule Take 1 capsule (72 mcg total) by mouth before breakfast. 30 capsule 5    nebulizer and compressor (COMP-AIR NEBULIZER COMPRESSOR) Tanvi To be used to nebulize medication 1 each 0    ondansetron (ZOFRAN-ODT) 4 MG TbDL Take 1 tablet (4 mg total) by mouth every 6 (six) hours as needed (nausea). 30 tablet 1    pen needle, diabetic (BD ULTRA-FINE SHORT PEN NEEDLE) 31 gauge x 5/16" Ndle Inject 1 Needle into the skin 2 (two) times a day. 100 each 5    primidone (MYSOLINE) 50 MG Tab Take 100 mg by mouth 3 (three) times daily.      sertraline (ZOLOFT) 50 MG tablet Take 50 mg by mouth.      sumatriptan (IMITREX) 100 MG tablet Take 1 tablet (100 mg total) by mouth as needed (Take at onset of the headache and repeat 2 hours later if needed.  Max 2 tabs in 24 hours.). 15 tablet 5    TENS unit and electrodes Cmpk 1 each by Misc.(Non-Drug; Combo Route) route once daily. 1 each 0    tiZANidine (ZANAFLEX) 4 MG tablet Take 1 tablet (4 mg total) by mouth every 8 (eight) hours. 90 tablet 5    traZODone (DESYREL) 100 MG tablet Take 150 mg by mouth nightly as needed.      azelastine (ASTELIN) 137 mcg (0.1 %) nasal spray 1 spray (137 mcg total) by Nasal route 2 (two) times daily. 30 mL 5    blood-glucose sensor (DEXCOM G7 SENSOR) Tanvi 1 Device by Misc.(Non-Drug; Combo Route) route every 10 days. 3 each 5    ergocalciferol (ERGOCALCIFEROL) 50,000 unit Cap Take 1 capsule (50,000 Units total) by mouth every 7 days. 4 capsule 5    fluconazole (DIFLUCAN) 150 MG Tab Take 1 tablet (150 mg total) by mouth once daily. for 1 day 1 tablet 0    fluticasone propionate (FLONASE) 50 mcg/actuation nasal spray 1 spray (50 mcg total) by Each Nostril route 2 (two) times daily. 16 g 5    insulin glargine U-100, Lantus, (LANTUS SOLOSTAR U-100 INSULIN) " "100 unit/mL (3 mL) InPn pen INJECT 65 UNITS SUBCUTANEOUSLY IN THE MORNING  AND 65 UNITS SUBCUTANEOUSLY IN THE EVENING ,MAY  TITRATE  UP  2  UNITS  PER  WEEK  TO  REACH  FASTING  GLUCOSE  OF  110. Max dose 85 units daily 30 mL 2    montelukast (SINGULAIR) 10 mg tablet Take 1 tablet (10 mg total) by mouth every evening. 30 tablet 6    norgestimate-ethinyl estradioL (ORTHO-CYCLEN) 0.25-35 mg-mcg per tablet Take 1 tablet by mouth once daily. 28 tablet 11    semaglutide (OZEMPIC) 0.25 mg or 0.5 mg (2 mg/3 mL) pen injector Inject 0.5 mg into the skin every 7 days. 3 mL 5     No current facility-administered medications for this visit.       Review of patient's allergies indicates:   Allergen Reactions    Oxycodone-acetaminophen Hives     Objective:      Blood pressure 110/70, pulse 95, temperature 97.5 °F (36.4 °C), height 5' 2" (1.575 m), weight 79.8 kg (176 lb), last menstrual period 11/27/2024, SpO2 98%. Body mass index is 32.19 kg/m².   Physical Exam    General: No acute distress. Well-developed. Well-nourished.  Eyes: EOMI. Sclerae anicteric.  HENT: Normocephalic. Facial asymmetry noted. Chronic bells palsy.  Nares patent. Moist oral mucosa.  Ears: Bilateral TMs clear. Bilateral EACs clear.  Cardiovascular: Regular rate. Regular rhythm. No murmurs. No rubs. No gallops. Normal S1, S2.  Respiratory: Normal respiratory effort. Clear to auscultation bilaterally. No rales. No rhonchi. No wheezing.  Abdomen: Soft. Non-tender. Non-distended. Normoactive bowel sounds.  Musculoskeletal: No  obvious deformity.  Extremities: No lower extremity edema.  Neurological: Alert & oriented x3. No slurred speech. Normal gait.  Psychiatric: Normal mood. Normal affect. Good insight. Good judgment.  Skin: Warm. Dry. No rash.         Assessment:       Assessment & Plan    - Assessed GYN symptoms, suspecting yeast infection possibly related to elevated blood sugar  - Evaluated knee pain in context of partial knee replacement history  - " Determined need for medication refills and adjustments  - Assessed need for updated labs and GYN care    DIABETES:  - Educated the patient about the connection between elevated blood sugar and increased risk of yeast infections.  - Refilled Ozempic and Dexcom sensors (to be changed every 10 days).  - Ordered updated labs.  - Instructed the patient to follow up in 1 month to review labs results and make any necessary medication adjustments.  - Monitored the patient's blood sugar using a Dexcom sensor.  - Evaluated the patient's current blood sugar.  - Refilled Lantus insulin.  - Confirmed the patient's current use of insulin.    SUSPECTED YEAST INFECTION:  - Assessed the patient's symptoms of vaginal discharge, strong urine odor, and itching.  - Evaluated the symptoms and suspected a yeast infection.  - Educated the patient about the connection between high blood sugar and increased risk of yeast infections.  - Noted the patient's report of urinary urgency.  - Noted the patient's report of vaginal itching and discomfort.  - Prescribed Diflucan: 1 pill for suspected yeast infection.  - Prescribed Diflucan for suspected yeast infection.    GYNECOLOGICAL CARE:  - Discussed the importance of regular GYN check-ups.  - Referred the patient to a gynecologist for regular check-ups.    KNEE PAIN:  - Noted the patient's history of partial knee replacement surgery on the left knee in 2019.  - Renewed orthopedic referral to Dr. Carreon for follow-up.  - Renewed orthopedic referral to Dr. Carreon for knee pain evaluation.  - Noted the patient's report of severe pain in left knee radiating upwards.  - Acknowledged the patient's concern about the knee replacement hardware.    BIRTH CONTROL:  - Noted the patient's report of last menstrual cycle in November.  - Ordered urine pregnancy test before restarting birth control.  - Restarted Ortho Tricyclen: instructed the patient to wait for negative pregnancy test before resuming.  -  Ordered urine pregnancy test.  - Instructed the patient to provide a urine sample for pregnancy test before leaving the office.    VITAMIN D SUPPLEMENTATION:  - Ordered vitamin D test.  - Included vitamin D in the list of medications to be refilled.       Plan:       Nicole was seen today for ortho referal and medication refill.    Diagnoses and all orders for this visit:    Other migraine with status migrainosus, not intractable  -     CBC Auto Differential; Future    Allergic rhinitis due to pollen, unspecified seasonality  -     azelastine (ASTELIN) 137 mcg (0.1 %) nasal spray; 1 spray (137 mcg total) by Nasal route 2 (two) times daily.  -     montelukast (SINGULAIR) 10 mg tablet; Take 1 tablet (10 mg total) by mouth every evening.    Post-nasal drainage  -     azelastine (ASTELIN) 137 mcg (0.1 %) nasal spray; 1 spray (137 mcg total) by Nasal route 2 (two) times daily.    Vitamin D deficiency  -     ergocalciferol (ERGOCALCIFEROL) 50,000 unit Cap; Take 1 capsule (50,000 Units total) by mouth every 7 days.  -     Vitamin D; Future    Type 2 diabetes mellitus with hyperglycemia, with long-term current use of insulin  Comments:  Patient used to be on Trulicity 4.5 mg with no relief.  Change to Ozempic and start with 0.5 and will go soon to 1 or 2.  Orders:  -     semaglutide (OZEMPIC) 0.25 mg or 0.5 mg (2 mg/3 mL) pen injector; Inject 0.5 mg into the skin every 7 days.  -     blood-glucose sensor (DEXCOM G7 SENSOR) Tanvi; 1 Device by Misc.(Non-Drug; Combo Route) route every 10 days.  -     Urinalysis; Future  -     Comprehensive Metabolic Panel; Future  -     Microalbumin/Creatinine Ratio, Urine; Future  -     Hemoglobin A1C; Future  -     Lipid Panel; Future    Type 2 diabetes mellitus without complication, with long-term current use of insulin  -     insulin glargine U-100, Lantus, (LANTUS SOLOSTAR U-100 INSULIN) 100 unit/mL (3 mL) InPn pen; INJECT 65 UNITS SUBCUTANEOUSLY IN THE MORNING  AND 65 UNITS SUBCUTANEOUSLY  IN THE EVENING ,MAY  TITRATE  UP  2  UNITS  PER  WEEK  TO  REACH  FASTING  GLUCOSE  OF  110. Max dose 85 units daily    Hormone replacement therapy  Comments:  On Ortho-Cyclen.  I sent a prescription today.  She is not sure which gynecologist she has seen in past.  Orders:  -     norgestimate-ethinyl estradioL (ORTHO-CYCLEN) 0.25-35 mg-mcg per tablet; Take 1 tablet by mouth once daily.    Sinus congestion  -     fluticasone propionate (FLONASE) 50 mcg/actuation nasal spray; 1 spray (50 mcg total) by Each Nostril route 2 (two) times daily.    Type 2 diabetes mellitus without complication, with long-term current use of insulin  Comments:  Labs for blood sugar are still pending.  Wanted a CGM device.  Even regular monitoring his difficult and I am not sure if CGM will be really helpful for her.  Orders:  -     insulin glargine U-100, Lantus, (LANTUS SOLOSTAR U-100 INSULIN) 100 unit/mL (3 mL) InPn pen; INJECT 65 UNITS SUBCUTANEOUSLY IN THE MORNING  AND 65 UNITS SUBCUTANEOUSLY IN THE EVENING ,MAY  TITRATE  UP  2  UNITS  PER  WEEK  TO  REACH  FASTING  GLUCOSE  OF  110. Max dose 85 units daily    Cognitive deficits    Facial paralysis/Gary palsy    Chronic pain of left knee  -     Ambulatory referral/consult to Orthopedics; Future    Encounter for well woman exam with routine gynecological exam  -     Ambulatory referral/consult to Obstetrics / Gynecology; Future    Vulvovaginal candidiasis  -     fluconazole (DIFLUCAN) 150 MG Tab; Take 1 tablet (150 mg total) by mouth once daily. for 1 day    Irregular menstruation  -     POCT Urine Pregnancy           This note was generated with the assistance of ambient listening technology. Verbal consent was obtained by the patient and accompanying visitor(s) for the recording of patient appointment to facilitate this note. I attest to having reviewed and edited the generated note for accuracy, though some syntax or spelling errors may persist. Please contact the author of this note  for any clarification.

## 2025-02-17 ENCOUNTER — PATIENT MESSAGE (OUTPATIENT)
Dept: ADMINISTRATIVE | Facility: HOSPITAL | Age: 50
End: 2025-02-17
Payer: MEDICAID